# Patient Record
Sex: FEMALE | Race: BLACK OR AFRICAN AMERICAN | Employment: UNEMPLOYED | URBAN - METROPOLITAN AREA
[De-identification: names, ages, dates, MRNs, and addresses within clinical notes are randomized per-mention and may not be internally consistent; named-entity substitution may affect disease eponyms.]

---

## 2019-03-04 ENCOUNTER — HOSPITAL ENCOUNTER (OUTPATIENT)
Dept: MAMMOGRAPHY | Age: 64
Discharge: HOME | End: 2019-03-04
Attending: NURSE PRACTITIONER
Payer: MEDICAID

## 2019-03-04 DIAGNOSIS — Z12.31 VISIT FOR SCREENING MAMMOGRAM: ICD-10-CM

## 2019-03-04 PROCEDURE — 77063 BREAST TOMOSYNTHESIS BI: CPT

## 2019-03-25 ENCOUNTER — HOSPITAL ENCOUNTER (OUTPATIENT)
Dept: MAMMOGRAPHY | Age: 64
Discharge: HOME | End: 2019-03-25
Attending: NURSE PRACTITIONER
Payer: MEDICAID

## 2019-03-25 ENCOUNTER — APPOINTMENT (OUTPATIENT)
Dept: ULTRASOUND IMAGING | Age: 64
End: 2019-03-25
Attending: NURSE PRACTITIONER
Payer: MEDICAID

## 2019-03-25 DIAGNOSIS — R92.2 BREAST DENSITY: ICD-10-CM

## 2019-03-25 PROCEDURE — 77065 DX MAMMO INCL CAD UNI: CPT

## 2020-06-18 ENCOUNTER — HOSPITAL ENCOUNTER (OUTPATIENT)
Dept: MAMMOGRAPHY | Age: 65
Discharge: HOME | End: 2020-06-18
Attending: NURSE PRACTITIONER
Payer: MEDICAID

## 2020-06-18 DIAGNOSIS — Z12.31 VISIT FOR SCREENING MAMMOGRAM: ICD-10-CM

## 2020-06-18 PROCEDURE — 77063 BREAST TOMOSYNTHESIS BI: CPT

## 2020-07-27 ENCOUNTER — HOSPITAL ENCOUNTER (OUTPATIENT)
Dept: MAMMOGRAPHY | Age: 65
Discharge: HOME | End: 2020-07-27
Attending: NURSE PRACTITIONER
Payer: MEDICAID

## 2020-07-27 ENCOUNTER — HOSPITAL ENCOUNTER (OUTPATIENT)
Dept: ULTRASOUND IMAGING | Age: 65
Discharge: HOME | End: 2020-07-27
Attending: NURSE PRACTITIONER
Payer: MEDICAID

## 2020-07-27 DIAGNOSIS — R92.8 ABNORMAL MAMMOGRAM: ICD-10-CM

## 2020-07-27 DIAGNOSIS — N63.0 LUMP OR MASS IN BREAST: ICD-10-CM

## 2020-07-27 PROCEDURE — 76642 ULTRASOUND BREAST LIMITED: CPT

## 2020-07-27 PROCEDURE — 77061 BREAST TOMOSYNTHESIS UNI: CPT

## 2021-01-01 ENCOUNTER — HOSPITAL ENCOUNTER (INPATIENT)
Age: 66
LOS: 4 days | Discharge: HOME | DRG: 435 | End: 2021-10-26
Attending: INTERNAL MEDICINE | Admitting: GENERAL ACUTE CARE HOSPITAL
Payer: MEDICARE

## 2021-01-01 ENCOUNTER — HOSPITAL ENCOUNTER (OUTPATIENT)
Dept: INFUSION THERAPY | Age: 66
Discharge: HOME | End: 2021-12-06
Payer: MEDICARE

## 2021-01-01 ENCOUNTER — HOSPITAL ENCOUNTER (OUTPATIENT)
Dept: MAMMOGRAPHY | Age: 66
Discharge: HOME | End: 2021-06-21
Attending: NURSE PRACTITIONER
Payer: MEDICARE

## 2021-01-01 ENCOUNTER — HOSPITAL ENCOUNTER (OUTPATIENT)
Dept: INFUSION THERAPY | Age: 66
End: 2021-01-01

## 2021-01-01 ENCOUNTER — DOCUMENTATION ONLY (OUTPATIENT)
Dept: ONCOLOGY | Age: 66
End: 2021-01-01

## 2021-01-01 ENCOUNTER — NURSE NAVIGATOR (OUTPATIENT)
Dept: OTHER | Age: 66
End: 2021-01-01

## 2021-01-01 ENCOUNTER — TRANSCRIBE ORDER (OUTPATIENT)
Dept: SCHEDULING | Age: 66
End: 2021-01-01

## 2021-01-01 ENCOUNTER — APPOINTMENT (OUTPATIENT)
Dept: INFUSION THERAPY | Age: 66
End: 2021-01-01

## 2021-01-01 ENCOUNTER — HOSPITAL ENCOUNTER (EMERGENCY)
Age: 66
Discharge: ANOTHER HOSPITAL | End: 2021-10-22
Attending: EMERGENCY MEDICINE
Payer: MEDICARE

## 2021-01-01 ENCOUNTER — APPOINTMENT (OUTPATIENT)
Dept: GENERAL RADIOLOGY | Age: 66
DRG: 435 | End: 2021-01-01
Attending: INTERNAL MEDICINE
Payer: MEDICARE

## 2021-01-01 ENCOUNTER — HOSPITAL ENCOUNTER (OUTPATIENT)
Dept: INFUSION THERAPY | Age: 66
Discharge: HOME | End: 2021-12-09
Payer: MEDICARE

## 2021-01-01 ENCOUNTER — HOSPITAL ENCOUNTER (OUTPATIENT)
Dept: LAB | Age: 66
Discharge: HOME | End: 2021-11-03
Payer: MEDICARE

## 2021-01-01 ENCOUNTER — TELEPHONE (OUTPATIENT)
Dept: ONCOLOGY | Age: 66
End: 2021-01-01

## 2021-01-01 ENCOUNTER — APPOINTMENT (OUTPATIENT)
Dept: MRI IMAGING | Age: 66
DRG: 435 | End: 2021-01-01
Attending: GENERAL ACUTE CARE HOSPITAL
Payer: MEDICARE

## 2021-01-01 ENCOUNTER — OFFICE VISIT (OUTPATIENT)
Dept: ONCOLOGY | Age: 66
End: 2021-01-01
Payer: MEDICARE

## 2021-01-01 ENCOUNTER — ANESTHESIA (OUTPATIENT)
Dept: SURGERY | Age: 66
DRG: 435 | End: 2021-01-01
Payer: MEDICARE

## 2021-01-01 ENCOUNTER — HOSPITAL ENCOUNTER (OUTPATIENT)
Dept: VASCULAR SURGERY | Age: 66
Discharge: HOME | End: 2021-12-08
Attending: INTERNAL MEDICINE
Payer: MEDICARE

## 2021-01-01 ENCOUNTER — HOSPITAL ENCOUNTER (OUTPATIENT)
Dept: INTERVENTIONAL RADIOLOGY/VASCULAR | Age: 66
Discharge: HOME | End: 2021-11-24
Attending: INTERNAL MEDICINE | Admitting: RADIOLOGY
Payer: MEDICARE

## 2021-01-01 ENCOUNTER — HOSPITAL ENCOUNTER (OUTPATIENT)
Dept: VASCULAR SURGERY | Age: 66
Discharge: HOME | End: 2021-11-04
Attending: INTERNAL MEDICINE
Payer: MEDICARE

## 2021-01-01 ENCOUNTER — HOSPITAL ENCOUNTER (OUTPATIENT)
Dept: INTERVENTIONAL RADIOLOGY/VASCULAR | Age: 66
Discharge: HOME | End: 2021-12-01
Payer: MEDICARE

## 2021-01-01 ENCOUNTER — APPOINTMENT (OUTPATIENT)
Dept: GENERAL RADIOLOGY | Age: 66
End: 2021-01-01
Attending: STUDENT IN AN ORGANIZED HEALTH CARE EDUCATION/TRAINING PROGRAM
Payer: MEDICARE

## 2021-01-01 ENCOUNTER — ANESTHESIA EVENT (OUTPATIENT)
Dept: SURGERY | Age: 66
DRG: 435 | End: 2021-01-01
Payer: MEDICARE

## 2021-01-01 ENCOUNTER — HOSPITAL ENCOUNTER (EMERGENCY)
Age: 66
Discharge: HOME | End: 2021-12-13
Attending: EMERGENCY MEDICINE
Payer: MEDICARE

## 2021-01-01 ENCOUNTER — HOSPITAL ENCOUNTER (EMERGENCY)
Age: 66
Discharge: LEFT BEFORE DISCHARGE | End: 2021-12-08
Payer: MEDICARE

## 2021-01-01 ENCOUNTER — HOSPITAL ENCOUNTER (OUTPATIENT)
Dept: INFUSION THERAPY | Age: 66
Discharge: HOME | End: 2021-12-20
Payer: MEDICARE

## 2021-01-01 ENCOUNTER — HOSPITAL ENCOUNTER (OUTPATIENT)
Dept: INTERVENTIONAL RADIOLOGY/VASCULAR | Age: 66
Discharge: HOME | End: 2021-11-24
Attending: INTERNAL MEDICINE
Payer: MEDICARE

## 2021-01-01 ENCOUNTER — HOSPITAL ENCOUNTER (EMERGENCY)
Age: 66
Discharge: HOME | End: 2021-10-12
Attending: STUDENT IN AN ORGANIZED HEALTH CARE EDUCATION/TRAINING PROGRAM
Payer: MEDICARE

## 2021-01-01 ENCOUNTER — OFFICE VISIT (OUTPATIENT)
Dept: FAMILY MEDICINE CLINIC | Age: 66
End: 2021-01-01
Payer: MEDICARE

## 2021-01-01 ENCOUNTER — HOSPITAL ENCOUNTER (OUTPATIENT)
Dept: INFUSION THERAPY | Age: 66
Discharge: HOME | End: 2021-12-07
Payer: MEDICARE

## 2021-01-01 ENCOUNTER — HOSPITAL ENCOUNTER (OUTPATIENT)
Dept: PET IMAGING | Age: 66
Discharge: HOME | End: 2021-11-11
Attending: INTERNAL MEDICINE
Payer: MEDICARE

## 2021-01-01 ENCOUNTER — HOSPITAL ENCOUNTER (OUTPATIENT)
Dept: ULTRASOUND IMAGING | Age: 66
Discharge: HOME | End: 2021-06-21
Attending: NURSE PRACTITIONER
Payer: MEDICARE

## 2021-01-01 ENCOUNTER — APPOINTMENT (OUTPATIENT)
Dept: CT IMAGING | Age: 66
End: 2021-01-01
Attending: EMERGENCY MEDICINE
Payer: MEDICARE

## 2021-01-01 VITALS
DIASTOLIC BLOOD PRESSURE: 72 MMHG | RESPIRATION RATE: 16 BRPM | SYSTOLIC BLOOD PRESSURE: 134 MMHG | HEIGHT: 66 IN | TEMPERATURE: 98.2 F | WEIGHT: 109 LBS | OXYGEN SATURATION: 98 % | HEART RATE: 73 BPM | BODY MASS INDEX: 17.52 KG/M2

## 2021-01-01 VITALS
HEART RATE: 119 BPM | DIASTOLIC BLOOD PRESSURE: 62 MMHG | SYSTOLIC BLOOD PRESSURE: 98 MMHG | TEMPERATURE: 97 F | RESPIRATION RATE: 18 BRPM | OXYGEN SATURATION: 97 %

## 2021-01-01 VITALS
BODY MASS INDEX: 16.74 KG/M2 | DIASTOLIC BLOOD PRESSURE: 62 MMHG | WEIGHT: 104.2 LBS | SYSTOLIC BLOOD PRESSURE: 98 MMHG | HEART RATE: 119 BPM | OXYGEN SATURATION: 100 % | RESPIRATION RATE: 18 BRPM | TEMPERATURE: 97.6 F | HEIGHT: 66 IN

## 2021-01-01 VITALS
HEIGHT: 66 IN | SYSTOLIC BLOOD PRESSURE: 137 MMHG | DIASTOLIC BLOOD PRESSURE: 76 MMHG | OXYGEN SATURATION: 97 % | HEART RATE: 98 BPM | RESPIRATION RATE: 18 BRPM | BODY MASS INDEX: 17.82 KG/M2 | WEIGHT: 110.89 LBS | TEMPERATURE: 99.4 F

## 2021-01-01 VITALS
SYSTOLIC BLOOD PRESSURE: 94 MMHG | OXYGEN SATURATION: 96 % | RESPIRATION RATE: 18 BRPM | TEMPERATURE: 98.2 F | DIASTOLIC BLOOD PRESSURE: 73 MMHG | HEART RATE: 120 BPM

## 2021-01-01 VITALS
OXYGEN SATURATION: 98 % | RESPIRATION RATE: 16 BRPM | DIASTOLIC BLOOD PRESSURE: 83 MMHG | HEART RATE: 122 BPM | TEMPERATURE: 99.8 F | WEIGHT: 107 LBS | BODY MASS INDEX: 17.19 KG/M2 | SYSTOLIC BLOOD PRESSURE: 128 MMHG | HEIGHT: 66 IN

## 2021-01-01 VITALS
RESPIRATION RATE: 16 BRPM | OXYGEN SATURATION: 99 % | TEMPERATURE: 97.3 F | HEIGHT: 66 IN | HEART RATE: 91 BPM | SYSTOLIC BLOOD PRESSURE: 103 MMHG | WEIGHT: 108.8 LBS | BODY MASS INDEX: 17.49 KG/M2 | DIASTOLIC BLOOD PRESSURE: 70 MMHG

## 2021-01-01 VITALS
SYSTOLIC BLOOD PRESSURE: 163 MMHG | BODY MASS INDEX: 23.6 KG/M2 | HEART RATE: 92 BPM | RESPIRATION RATE: 18 BRPM | WEIGHT: 125 LBS | OXYGEN SATURATION: 100 % | HEIGHT: 61 IN | TEMPERATURE: 98.3 F | DIASTOLIC BLOOD PRESSURE: 86 MMHG

## 2021-01-01 VITALS
OXYGEN SATURATION: 97 % | SYSTOLIC BLOOD PRESSURE: 104 MMHG | RESPIRATION RATE: 18 BRPM | WEIGHT: 107 LBS | DIASTOLIC BLOOD PRESSURE: 74 MMHG | BODY MASS INDEX: 17.19 KG/M2 | HEART RATE: 112 BPM | HEIGHT: 66 IN | TEMPERATURE: 99 F

## 2021-01-01 VITALS
TEMPERATURE: 99.8 F | BODY MASS INDEX: 17.29 KG/M2 | HEIGHT: 66 IN | DIASTOLIC BLOOD PRESSURE: 83 MMHG | WEIGHT: 107.6 LBS | OXYGEN SATURATION: 98 % | HEART RATE: 122 BPM | SYSTOLIC BLOOD PRESSURE: 128 MMHG

## 2021-01-01 VITALS
TEMPERATURE: 98 F | HEART RATE: 107 BPM | SYSTOLIC BLOOD PRESSURE: 103 MMHG | HEIGHT: 66 IN | WEIGHT: 107 LBS | DIASTOLIC BLOOD PRESSURE: 62 MMHG | BODY MASS INDEX: 17.19 KG/M2 | OXYGEN SATURATION: 100 % | RESPIRATION RATE: 15 BRPM

## 2021-01-01 VITALS
RESPIRATION RATE: 16 BRPM | DIASTOLIC BLOOD PRESSURE: 72 MMHG | OXYGEN SATURATION: 98 % | DIASTOLIC BLOOD PRESSURE: 75 MMHG | OXYGEN SATURATION: 97 % | SYSTOLIC BLOOD PRESSURE: 114 MMHG | HEIGHT: 66 IN | TEMPERATURE: 98.1 F | WEIGHT: 111 LBS | TEMPERATURE: 98.8 F | BODY MASS INDEX: 17.84 KG/M2 | RESPIRATION RATE: 16 BRPM | HEART RATE: 98 BPM | HEART RATE: 89 BPM | SYSTOLIC BLOOD PRESSURE: 164 MMHG | BODY MASS INDEX: 17.37 KG/M2 | HEIGHT: 66 IN

## 2021-01-01 VITALS
WEIGHT: 111 LBS | HEART RATE: 90 BPM | DIASTOLIC BLOOD PRESSURE: 72 MMHG | BODY MASS INDEX: 17.84 KG/M2 | SYSTOLIC BLOOD PRESSURE: 123 MMHG | OXYGEN SATURATION: 100 % | HEIGHT: 66 IN | RESPIRATION RATE: 18 BRPM

## 2021-01-01 DIAGNOSIS — C7A.098 MALIGNANT CARCINOID TUMORS OF OTHER SITES (HCC): ICD-10-CM

## 2021-01-01 DIAGNOSIS — D64.9 NORMOCYTIC ANEMIA: ICD-10-CM

## 2021-01-01 DIAGNOSIS — C78.7 PANCREATIC CARCINOMA METASTATIC TO LIVER (HCC): Primary | ICD-10-CM

## 2021-01-01 DIAGNOSIS — M79.601 PAIN OF RIGHT UPPER EXTREMITY: ICD-10-CM

## 2021-01-01 DIAGNOSIS — C25.9 PANCREATIC ADENOCARCINOMA (HCC): ICD-10-CM

## 2021-01-01 DIAGNOSIS — C25.9 METASTASIS FROM PANCREATIC CANCER (HCC): ICD-10-CM

## 2021-01-01 DIAGNOSIS — D72.829 LEUKOCYTOSIS, UNSPECIFIED TYPE: ICD-10-CM

## 2021-01-01 DIAGNOSIS — N63.0 BREAST MASS: ICD-10-CM

## 2021-01-01 DIAGNOSIS — K83.1 OBSTRUCTIVE JAUNDICE: Primary | ICD-10-CM

## 2021-01-01 DIAGNOSIS — Z09 FOLLOW-UP EXAM: ICD-10-CM

## 2021-01-01 DIAGNOSIS — C25.9 PANCREATIC CARCINOMA METASTATIC TO LIVER (HCC): Primary | ICD-10-CM

## 2021-01-01 DIAGNOSIS — G89.3 CANCER RELATED PAIN: ICD-10-CM

## 2021-01-01 DIAGNOSIS — R10.84 ABDOMINAL PAIN, GENERALIZED: Primary | ICD-10-CM

## 2021-01-01 DIAGNOSIS — C79.9 METASTASIS FROM PANCREATIC CANCER (HCC): ICD-10-CM

## 2021-01-01 DIAGNOSIS — K86.89 PANCREATIC MASS: Primary | ICD-10-CM

## 2021-01-01 DIAGNOSIS — C78.7 LIVER METASTASES (HCC): ICD-10-CM

## 2021-01-01 DIAGNOSIS — K83.1 OBSTRUCTIVE JAUNDICE: ICD-10-CM

## 2021-01-01 DIAGNOSIS — D50.9 IRON DEFICIENCY ANEMIA, UNSPECIFIED IRON DEFICIENCY ANEMIA TYPE: Primary | ICD-10-CM

## 2021-01-01 DIAGNOSIS — R19.7 DIARRHEA, UNSPECIFIED TYPE: Primary | ICD-10-CM

## 2021-01-01 DIAGNOSIS — M79.89 SWELLING OF ARM: ICD-10-CM

## 2021-01-01 DIAGNOSIS — R92.8 ABNORMAL MAMMOGRAM: ICD-10-CM

## 2021-01-01 DIAGNOSIS — M79.606 PAIN AND SWELLING OF LOWER EXTREMITY, UNSPECIFIED LATERALITY: ICD-10-CM

## 2021-01-01 DIAGNOSIS — R92.2 INCONCLUSIVE MAMMOGRAPHY: Primary | ICD-10-CM

## 2021-01-01 DIAGNOSIS — D50.9 IRON DEFICIENCY ANEMIA, UNSPECIFIED IRON DEFICIENCY ANEMIA TYPE: ICD-10-CM

## 2021-01-01 DIAGNOSIS — C78.7 PANCREATIC CARCINOMA METASTATIC TO LIVER (HCC): ICD-10-CM

## 2021-01-01 DIAGNOSIS — R16.0 LIVER MASS: ICD-10-CM

## 2021-01-01 DIAGNOSIS — Z12.31 VISIT FOR SCREENING MAMMOGRAM: Primary | ICD-10-CM

## 2021-01-01 DIAGNOSIS — M79.89 PAIN AND SWELLING OF LOWER EXTREMITY, UNSPECIFIED LATERALITY: ICD-10-CM

## 2021-01-01 DIAGNOSIS — K86.89 PANCREATIC MASS: ICD-10-CM

## 2021-01-01 DIAGNOSIS — R63.6 PATIENT UNDERWEIGHT: ICD-10-CM

## 2021-01-01 DIAGNOSIS — R10.9 INTERMITTENT ABDOMINAL PAIN: Primary | ICD-10-CM

## 2021-01-01 DIAGNOSIS — C25.9 PANCREATIC CARCINOMA METASTATIC TO LIVER (HCC): ICD-10-CM

## 2021-01-01 DIAGNOSIS — C25.9 PANCREATIC CARCINOMA (HCC): ICD-10-CM

## 2021-01-01 DIAGNOSIS — C7A.098 MALIGNANT CARCINOID TUMORS OF OTHER SITES (HCC): Primary | ICD-10-CM

## 2021-01-01 LAB
ALBUMIN SERPL-MCNC: 1.7 G/DL (ref 3.4–5)
ALBUMIN SERPL-MCNC: 1.8 G/DL (ref 3.4–5)
ALBUMIN SERPL-MCNC: 1.9 G/DL (ref 3.5–5)
ALBUMIN SERPL-MCNC: 1.9 G/DL (ref 3.5–5)
ALBUMIN SERPL-MCNC: 2 G/DL (ref 3.4–5)
ALBUMIN SERPL-MCNC: 2 G/DL (ref 3.5–5)
ALBUMIN SERPL-MCNC: 2 G/DL (ref 3.5–5)
ALBUMIN SERPL-MCNC: 2.1 G/DL (ref 3.4–5)
ALBUMIN SERPL-MCNC: 2.2 G/DL (ref 3.4–5)
ALBUMIN SERPL-MCNC: 2.5 G/DL (ref 3.4–5)
ALBUMIN SERPL-MCNC: 2.5 G/DL (ref 3.4–5)
ALBUMIN SERPL-MCNC: 2.6 G/DL (ref 3.4–5)
ALBUMIN/GLOB SERPL: 0.4 {RATIO} (ref 0.8–1.7)
ALBUMIN/GLOB SERPL: 0.4 {RATIO} (ref 1.1–2.2)
ALBUMIN/GLOB SERPL: 0.5 {RATIO} (ref 0.8–1.7)
ALBUMIN/GLOB SERPL: 0.5 {RATIO} (ref 0.8–1.7)
ALBUMIN/GLOB SERPL: 0.5 {RATIO} (ref 1.1–2.2)
ALBUMIN/GLOB SERPL: 0.6 {RATIO} (ref 0.8–1.7)
ALP SERPL-CCNC: 417 U/L (ref 45–117)
ALP SERPL-CCNC: 432 U/L (ref 45–117)
ALP SERPL-CCNC: 433 U/L (ref 45–117)
ALP SERPL-CCNC: 434 U/L (ref 45–117)
ALP SERPL-CCNC: 445 U/L (ref 45–117)
ALP SERPL-CCNC: 448 U/L (ref 45–117)
ALP SERPL-CCNC: 458 U/L (ref 45–117)
ALP SERPL-CCNC: 492 U/L (ref 45–117)
ALP SERPL-CCNC: 493 U/L (ref 45–117)
ALP SERPL-CCNC: 746 U/L (ref 45–117)
ALP SERPL-CCNC: 782 U/L (ref 45–117)
ALP SERPL-CCNC: 822 U/L (ref 45–117)
ALT SERPL-CCNC: 119 U/L (ref 13–56)
ALT SERPL-CCNC: 248 U/L (ref 12–78)
ALT SERPL-CCNC: 294 U/L (ref 12–78)
ALT SERPL-CCNC: 321 U/L (ref 12–78)
ALT SERPL-CCNC: 352 U/L (ref 12–78)
ALT SERPL-CCNC: 36 U/L (ref 13–56)
ALT SERPL-CCNC: 406 U/L (ref 13–56)
ALT SERPL-CCNC: 505 U/L (ref 13–56)
ALT SERPL-CCNC: 54 U/L (ref 13–56)
ALT SERPL-CCNC: 55 U/L (ref 13–56)
ALT SERPL-CCNC: 579 U/L (ref 13–56)
ALT SERPL-CCNC: 58 U/L (ref 13–56)
ANION GAP SERPL CALC-SCNC: 4 MMOL/L (ref 3–18)
ANION GAP SERPL CALC-SCNC: 4 MMOL/L (ref 5–15)
ANION GAP SERPL CALC-SCNC: 5 MMOL/L (ref 3–18)
ANION GAP SERPL CALC-SCNC: 5 MMOL/L (ref 3–18)
ANION GAP SERPL CALC-SCNC: 6 MMOL/L (ref 3–18)
ANION GAP SERPL CALC-SCNC: 6 MMOL/L (ref 5–15)
ANION GAP SERPL CALC-SCNC: 7 MMOL/L (ref 3–18)
ANION GAP SERPL CALC-SCNC: 7 MMOL/L (ref 3–18)
ANION GAP SERPL CALC-SCNC: 8 MMOL/L (ref 3–18)
ANION GAP SERPL CALC-SCNC: 9 MMOL/L (ref 3–18)
APPEARANCE UR: CLEAR
APTT PPP: 34.6 SEC (ref 23–36.4)
AST SERPL-CCNC: 100 U/L (ref 10–38)
AST SERPL-CCNC: 103 U/L (ref 10–38)
AST SERPL-CCNC: 117 U/L (ref 15–37)
AST SERPL-CCNC: 157 U/L (ref 15–37)
AST SERPL-CCNC: 179 U/L (ref 15–37)
AST SERPL-CCNC: 186 U/L (ref 15–37)
AST SERPL-CCNC: 212 U/L (ref 10–38)
AST SERPL-CCNC: 234 U/L (ref 10–38)
AST SERPL-CCNC: 283 U/L (ref 10–38)
AST SERPL-CCNC: 63 U/L (ref 10–38)
AST SERPL-CCNC: 77 U/L (ref 10–38)
AST SERPL-CCNC: 92 U/L (ref 10–38)
BACTERIA URNS QL MICRO: ABNORMAL /HPF
BASO+EOS+MONOS # BLD AUTO: 1.2 K/UL (ref 0–2.3)
BASO+EOS+MONOS # BLD AUTO: 1.8 K/UL (ref 0–2.3)
BASO+EOS+MONOS NFR BLD AUTO: 5 % (ref 0.1–17)
BASO+EOS+MONOS NFR BLD AUTO: 7 % (ref 0.1–17)
BASOPHILS # BLD: 0 K/UL (ref 0–0.1)
BASOPHILS # BLD: 0.1 K/UL (ref 0–0.1)
BASOPHILS # BLD: 0.1 K/UL (ref 0–0.1)
BASOPHILS NFR BLD: 0 % (ref 0–2)
BASOPHILS NFR BLD: 1 % (ref 0–1)
BILIRUB DIRECT SERPL-MCNC: 1.6 MG/DL (ref 0–0.2)
BILIRUB DIRECT SERPL-MCNC: 5.6 MG/DL (ref 0–0.2)
BILIRUB DIRECT SERPL-MCNC: 6.2 MG/DL (ref 0–0.2)
BILIRUB SERPL-MCNC: 2 MG/DL (ref 0.2–1)
BILIRUB SERPL-MCNC: 2.3 MG/DL (ref 0.2–1)
BILIRUB SERPL-MCNC: 3.6 MG/DL (ref 0.2–1)
BILIRUB SERPL-MCNC: 4.5 MG/DL (ref 0.2–1)
BILIRUB SERPL-MCNC: 5.1 MG/DL (ref 0.2–1)
BILIRUB SERPL-MCNC: 6.2 MG/DL (ref 0.2–1)
BILIRUB SERPL-MCNC: 7.3 MG/DL (ref 0.2–1)
BILIRUB SERPL-MCNC: 7.5 MG/DL (ref 0.2–1)
BILIRUB SERPL-MCNC: 8 MG/DL (ref 0.2–1)
BILIRUB SERPL-MCNC: 8.6 MG/DL (ref 0.2–1)
BILIRUB SERPL-MCNC: 8.8 MG/DL (ref 0.2–1)
BILIRUB SERPL-MCNC: 9.4 MG/DL (ref 0.2–1)
BILIRUB UR QL: ABNORMAL
BUN SERPL-MCNC: 13 MG/DL (ref 7–18)
BUN SERPL-MCNC: 2 MG/DL (ref 6–20)
BUN SERPL-MCNC: 23 MG/DL (ref 7–18)
BUN SERPL-MCNC: 4 MG/DL (ref 7–18)
BUN SERPL-MCNC: 5 MG/DL (ref 6–20)
BUN SERPL-MCNC: 6 MG/DL (ref 6–20)
BUN SERPL-MCNC: 7 MG/DL (ref 6–20)
BUN SERPL-MCNC: 7 MG/DL (ref 7–18)
BUN SERPL-MCNC: 8 MG/DL (ref 7–18)
BUN SERPL-MCNC: 9 MG/DL (ref 7–18)
BUN/CREAT SERPL: 11 (ref 12–20)
BUN/CREAT SERPL: 11 (ref 12–20)
BUN/CREAT SERPL: 14 (ref 12–20)
BUN/CREAT SERPL: 15 (ref 12–20)
BUN/CREAT SERPL: 16 (ref 12–20)
BUN/CREAT SERPL: 16 (ref 12–20)
BUN/CREAT SERPL: 17 (ref 12–20)
BUN/CREAT SERPL: 19 (ref 12–20)
BUN/CREAT SERPL: 19 (ref 12–20)
BUN/CREAT SERPL: 23 (ref 12–20)
BUN/CREAT SERPL: 42 (ref 12–20)
BUN/CREAT SERPL: 6 (ref 12–20)
CALCIUM SERPL-MCNC: 7.7 MG/DL (ref 8.5–10.1)
CALCIUM SERPL-MCNC: 7.9 MG/DL (ref 8.5–10.1)
CALCIUM SERPL-MCNC: 7.9 MG/DL (ref 8.5–10.1)
CALCIUM SERPL-MCNC: 8.4 MG/DL (ref 8.5–10.1)
CALCIUM SERPL-MCNC: 8.4 MG/DL (ref 8.5–10.1)
CALCIUM SERPL-MCNC: 8.5 MG/DL (ref 8.5–10.1)
CALCIUM SERPL-MCNC: 8.7 MG/DL (ref 8.5–10.1)
CALCIUM SERPL-MCNC: 9 MG/DL (ref 8.5–10.1)
CALCIUM SERPL-MCNC: 9.1 MG/DL (ref 8.5–10.1)
CANCER AG19-9 SERPL-ACNC: 12 U/ML (ref 0–35)
CANCER AG19-9 SERPL-ACNC: 19 U/ML (ref 0–35)
CHLORIDE SERPL-SCNC: 100 MMOL/L (ref 97–108)
CHLORIDE SERPL-SCNC: 101 MMOL/L (ref 100–111)
CHLORIDE SERPL-SCNC: 102 MMOL/L (ref 100–111)
CHLORIDE SERPL-SCNC: 102 MMOL/L (ref 97–108)
CHLORIDE SERPL-SCNC: 103 MMOL/L (ref 97–108)
CHLORIDE SERPL-SCNC: 104 MMOL/L (ref 97–108)
CHLORIDE SERPL-SCNC: 107 MMOL/L (ref 100–111)
CHLORIDE SERPL-SCNC: 92 MMOL/L (ref 100–111)
CHLORIDE SERPL-SCNC: 95 MMOL/L (ref 100–111)
CHLORIDE SERPL-SCNC: 97 MMOL/L (ref 100–111)
CO2 SERPL-SCNC: 27 MMOL/L (ref 21–32)
CO2 SERPL-SCNC: 28 MMOL/L (ref 21–32)
CO2 SERPL-SCNC: 29 MMOL/L (ref 21–32)
CO2 SERPL-SCNC: 30 MMOL/L (ref 21–32)
CO2 SERPL-SCNC: 31 MMOL/L (ref 21–32)
CO2 SERPL-SCNC: 31 MMOL/L (ref 21–32)
COLOR UR: ABNORMAL
COVID-19 RAPID TEST, COVR: NOT DETECTED
CREAT SERPL-MCNC: 0.33 MG/DL (ref 0.55–1.02)
CREAT SERPL-MCNC: 0.38 MG/DL (ref 0.55–1.02)
CREAT SERPL-MCNC: 0.38 MG/DL (ref 0.6–1.3)
CREAT SERPL-MCNC: 0.43 MG/DL (ref 0.6–1.3)
CREAT SERPL-MCNC: 0.46 MG/DL (ref 0.55–1.02)
CREAT SERPL-MCNC: 0.47 MG/DL (ref 0.6–1.3)
CREAT SERPL-MCNC: 0.48 MG/DL (ref 0.6–1.3)
CREAT SERPL-MCNC: 0.5 MG/DL (ref 0.55–1.02)
CREAT SERPL-MCNC: 0.52 MG/DL (ref 0.6–1.3)
CREAT SERPL-MCNC: 0.55 MG/DL (ref 0.6–1.3)
CREAT SERPL-MCNC: 0.57 MG/DL (ref 0.6–1.3)
CREAT SERPL-MCNC: 0.58 MG/DL (ref 0.6–1.3)
CREAT UR-MCNC: 0.6 MG/DL (ref 0.6–1.3)
DIFFERENTIAL METHOD BLD: ABNORMAL
EOSINOPHIL # BLD: 0 K/UL (ref 0–0.4)
EOSINOPHIL # BLD: 0 K/UL (ref 0–0.4)
EOSINOPHIL # BLD: 0.1 K/UL (ref 0–0.4)
EOSINOPHIL # BLD: 0.3 K/UL (ref 0–0.4)
EOSINOPHIL # BLD: 1 K/UL (ref 0–0.4)
EOSINOPHIL NFR BLD: 0 % (ref 0–5)
EOSINOPHIL NFR BLD: 0 % (ref 0–5)
EOSINOPHIL NFR BLD: 1 % (ref 0–5)
EOSINOPHIL NFR BLD: 2 % (ref 0–5)
EOSINOPHIL NFR BLD: 3 % (ref 0–5)
EOSINOPHIL NFR BLD: 3 % (ref 0–5)
EOSINOPHIL NFR BLD: 3 % (ref 0–7)
EOSINOPHIL NFR BLD: 4 % (ref 0–5)
EPITH CASTS URNS QL MICRO: ABNORMAL /LPF (ref 0–5)
ERYTHROCYTE [DISTWIDTH] IN BLOOD BY AUTOMATED COUNT: 14.6 % (ref 11.6–14.5)
ERYTHROCYTE [DISTWIDTH] IN BLOOD BY AUTOMATED COUNT: 14.6 % (ref 11.6–14.5)
ERYTHROCYTE [DISTWIDTH] IN BLOOD BY AUTOMATED COUNT: 14.7 % (ref 11.6–14.5)
ERYTHROCYTE [DISTWIDTH] IN BLOOD BY AUTOMATED COUNT: 14.9 % (ref 11.6–14.5)
ERYTHROCYTE [DISTWIDTH] IN BLOOD BY AUTOMATED COUNT: 15 % (ref 11.5–14.5)
ERYTHROCYTE [DISTWIDTH] IN BLOOD BY AUTOMATED COUNT: 15.2 % (ref 11.6–14.5)
ERYTHROCYTE [DISTWIDTH] IN BLOOD BY AUTOMATED COUNT: 16.9 % (ref 11.6–14.5)
ERYTHROCYTE [DISTWIDTH] IN BLOOD BY AUTOMATED COUNT: 17.3 % (ref 11.6–14.5)
ERYTHROCYTE [DISTWIDTH] IN BLOOD BY AUTOMATED COUNT: 17.5 % (ref 11.5–14.5)
ERYTHROCYTE [DISTWIDTH] IN BLOOD BY AUTOMATED COUNT: 18.2 % (ref 11.5–14.5)
ERYTHROCYTE [DISTWIDTH] IN BLOOD BY AUTOMATED COUNT: 23.2 % (ref 11.6–14.5)
FERRITIN SERPL-MCNC: 748 NG/ML (ref 8–388)
FOLATE SERPL-MCNC: 13.5 NG/ML (ref 3.1–17.5)
GLOBULIN SER CALC-MCNC: 3.8 G/DL (ref 2–4)
GLOBULIN SER CALC-MCNC: 3.9 G/DL (ref 2–4)
GLOBULIN SER CALC-MCNC: 4 G/DL (ref 2–4)
GLOBULIN SER CALC-MCNC: 4.1 G/DL (ref 2–4)
GLOBULIN SER CALC-MCNC: 4.1 G/DL (ref 2–4)
GLOBULIN SER CALC-MCNC: 4.2 G/DL (ref 2–4)
GLOBULIN SER CALC-MCNC: 4.3 G/DL (ref 2–4)
GLOBULIN SER CALC-MCNC: 4.4 G/DL (ref 2–4)
GLOBULIN SER CALC-MCNC: 4.7 G/DL (ref 2–4)
GLOBULIN SER CALC-MCNC: 5.1 G/DL (ref 2–4)
GLUCOSE SERPL-MCNC: 106 MG/DL (ref 74–99)
GLUCOSE SERPL-MCNC: 106 MG/DL (ref 74–99)
GLUCOSE SERPL-MCNC: 111 MG/DL (ref 65–100)
GLUCOSE SERPL-MCNC: 112 MG/DL (ref 65–100)
GLUCOSE SERPL-MCNC: 114 MG/DL (ref 74–99)
GLUCOSE SERPL-MCNC: 115 MG/DL (ref 65–100)
GLUCOSE SERPL-MCNC: 124 MG/DL (ref 74–99)
GLUCOSE SERPL-MCNC: 145 MG/DL (ref 74–99)
GLUCOSE SERPL-MCNC: 86 MG/DL (ref 74–99)
GLUCOSE SERPL-MCNC: 95 MG/DL (ref 74–99)
GLUCOSE SERPL-MCNC: 95 MG/DL (ref 74–99)
GLUCOSE SERPL-MCNC: 98 MG/DL (ref 65–100)
GLUCOSE UR STRIP.AUTO-MCNC: 250 MG/DL
HBV CORE AB SERPL QL IA: NEGATIVE
HBV CORE IGM SER QL: NEGATIVE
HBV SURFACE AG SER QL: <0.1 INDEX
HBV SURFACE AG SER QL: NEGATIVE
HCT VFR BLD AUTO: 31.6 % (ref 35–47)
HCT VFR BLD AUTO: 31.7 % (ref 35–45)
HCT VFR BLD AUTO: 32.1 % (ref 35–45)
HCT VFR BLD AUTO: 33.3 % (ref 36–48)
HCT VFR BLD AUTO: 33.4 % (ref 35–45)
HCT VFR BLD AUTO: 33.7 % (ref 35–45)
HCT VFR BLD AUTO: 34.8 % (ref 35–45)
HCT VFR BLD AUTO: 35 % (ref 35–45)
HCT VFR BLD AUTO: 37 % (ref 36–48)
HCT VFR BLD AUTO: 37.3 % (ref 35–45)
HCT VFR BLD AUTO: 38.2 % (ref 35–45)
HGB BLD-MCNC: 10.3 G/DL (ref 12–16)
HGB BLD-MCNC: 10.6 G/DL (ref 12–16)
HGB BLD-MCNC: 10.6 G/DL (ref 12–16)
HGB BLD-MCNC: 10.7 G/DL (ref 11.5–16)
HGB BLD-MCNC: 10.9 G/DL (ref 12–16)
HGB BLD-MCNC: 11.3 G/DL (ref 12–16)
HGB BLD-MCNC: 11.4 G/DL (ref 12–16)
HGB BLD-MCNC: 11.6 G/DL (ref 12–16)
HGB BLD-MCNC: 11.6 G/DL (ref 12–16)
HGB BLD-MCNC: 11.8 G/DL (ref 12–16)
HGB BLD-MCNC: 12.6 G/DL (ref 12–16)
HGB UR QL STRIP: NEGATIVE
IMM GRANULOCYTES # BLD AUTO: 0 K/UL
IMM GRANULOCYTES # BLD AUTO: 0 K/UL (ref 0–0.04)
IMM GRANULOCYTES NFR BLD AUTO: 0 %
IMM GRANULOCYTES NFR BLD AUTO: 0 % (ref 0–0.5)
INR PPP: 1.2 (ref 0.9–1.1)
INR PPP: 1.6 (ref 0.8–1.2)
IRON SATN MFR SERPL: 11 % (ref 20–50)
IRON SERPL-MCNC: 21 UG/DL (ref 50–175)
KETONES UR QL STRIP.AUTO: ABNORMAL MG/DL
LACTATE BLD-SCNC: 2.16 MMOL/L (ref 0.4–2)
LEUKOCYTE ESTERASE UR QL STRIP.AUTO: ABNORMAL
LIPASE SERPL-CCNC: 135 U/L (ref 73–393)
LIPASE SERPL-CCNC: 1861 U/L (ref 73–393)
LIPASE SERPL-CCNC: 2695 U/L (ref 73–393)
LIPASE SERPL-CCNC: 3307 U/L (ref 73–393)
LIPASE SERPL-CCNC: 5060 U/L (ref 73–393)
LYMPHOCYTES # BLD: 0.5 K/UL (ref 0.9–3.6)
LYMPHOCYTES # BLD: 1.4 K/UL (ref 0.9–3.6)
LYMPHOCYTES # BLD: 1.6 K/UL (ref 0.8–3.5)
LYMPHOCYTES # BLD: 1.7 K/UL (ref 0.9–3.6)
LYMPHOCYTES # BLD: 1.7 K/UL (ref 0.9–3.6)
LYMPHOCYTES # BLD: 1.8 K/UL (ref 0.9–3.6)
LYMPHOCYTES # BLD: 2.1 K/UL (ref 1.1–5.9)
LYMPHOCYTES # BLD: 2.3 K/UL (ref 1.1–5.9)
LYMPHOCYTES # BLD: 3.5 K/UL (ref 0.9–3.6)
LYMPHOCYTES # BLD: 4.4 K/UL (ref 0.9–3.6)
LYMPHOCYTES NFR BLD: 10 % (ref 21–52)
LYMPHOCYTES NFR BLD: 12 % (ref 21–52)
LYMPHOCYTES NFR BLD: 13 % (ref 21–52)
LYMPHOCYTES NFR BLD: 15 % (ref 21–52)
LYMPHOCYTES NFR BLD: 15 % (ref 21–52)
LYMPHOCYTES NFR BLD: 16 % (ref 12–49)
LYMPHOCYTES NFR BLD: 16 % (ref 21–52)
LYMPHOCYTES NFR BLD: 2 % (ref 21–52)
LYMPHOCYTES NFR BLD: 8 % (ref 14–44)
LYMPHOCYTES NFR BLD: 9 % (ref 14–44)
MAGNESIUM SERPL-MCNC: 1.8 MG/DL (ref 1.6–2.4)
MAGNESIUM SERPL-MCNC: 2.2 MG/DL (ref 1.6–2.6)
MCH RBC QN AUTO: 27.2 PG (ref 24–34)
MCH RBC QN AUTO: 27.2 PG (ref 24–34)
MCH RBC QN AUTO: 27.4 PG (ref 24–34)
MCH RBC QN AUTO: 27.4 PG (ref 24–34)
MCH RBC QN AUTO: 27.7 PG (ref 24–34)
MCH RBC QN AUTO: 28 PG (ref 25–35)
MCH RBC QN AUTO: 28.2 PG (ref 25–35)
MCH RBC QN AUTO: 28.5 PG (ref 24–34)
MCH RBC QN AUTO: 28.6 PG (ref 24–34)
MCH RBC QN AUTO: 28.7 PG (ref 24–34)
MCH RBC QN AUTO: 28.8 PG (ref 26–34)
MCHC RBC AUTO-ENTMCNC: 30.9 G/DL (ref 31–37)
MCHC RBC AUTO-ENTMCNC: 31.3 G/DL (ref 31–37)
MCHC RBC AUTO-ENTMCNC: 31.4 G/DL (ref 31–37)
MCHC RBC AUTO-ENTMCNC: 31.8 G/DL (ref 31–37)
MCHC RBC AUTO-ENTMCNC: 32.3 G/DL (ref 31–37)
MCHC RBC AUTO-ENTMCNC: 32.5 G/DL (ref 31–37)
MCHC RBC AUTO-ENTMCNC: 33 G/DL (ref 31–37)
MCHC RBC AUTO-ENTMCNC: 33.8 G/DL (ref 31–37)
MCHC RBC AUTO-ENTMCNC: 33.9 G/DL (ref 30–36.5)
MCHC RBC AUTO-ENTMCNC: 34.1 G/DL (ref 31–37)
MCHC RBC AUTO-ENTMCNC: 34.4 G/DL (ref 31–37)
MCV RBC AUTO: 82.9 FL (ref 78–100)
MCV RBC AUTO: 83 FL (ref 78–100)
MCV RBC AUTO: 83.5 FL (ref 78–100)
MCV RBC AUTO: 84.1 FL (ref 78–100)
MCV RBC AUTO: 84.3 FL (ref 78–100)
MCV RBC AUTO: 85 FL (ref 78–100)
MCV RBC AUTO: 85.2 FL (ref 80–99)
MCV RBC AUTO: 86.8 FL (ref 78–100)
MCV RBC AUTO: 88.6 FL (ref 78–102)
MCV RBC AUTO: 89.2 FL (ref 78–102)
MCV RBC AUTO: 89.7 FL (ref 78–100)
METAMYELOCYTES NFR BLD MANUAL: 3 %
MONOCYTES # BLD: 0.6 K/UL (ref 0.05–1.2)
MONOCYTES # BLD: 0.8 K/UL (ref 0.05–1.2)
MONOCYTES # BLD: 1 K/UL (ref 0.05–1.2)
MONOCYTES # BLD: 1 K/UL (ref 0–1)
MONOCYTES # BLD: 1.1 K/UL (ref 0.05–1.2)
MONOCYTES # BLD: 1.1 K/UL (ref 0.05–1.2)
MONOCYTES NFR BLD: 2 % (ref 3–10)
MONOCYTES NFR BLD: 3 % (ref 3–10)
MONOCYTES NFR BLD: 4 % (ref 3–10)
MONOCYTES NFR BLD: 7 % (ref 3–10)
MONOCYTES NFR BLD: 8 % (ref 3–10)
MONOCYTES NFR BLD: 9 % (ref 3–10)
MONOCYTES NFR BLD: 9 % (ref 3–10)
MONOCYTES NFR BLD: 9 % (ref 5–13)
MUCOUS THREADS URNS QL MICRO: ABNORMAL /LPF
MYELOCYTES NFR BLD MANUAL: 2 %
NEUTS BAND NFR BLD MANUAL: 1 % (ref 0–5)
NEUTS BAND NFR BLD MANUAL: 10 % (ref 0–5)
NEUTS SEG # BLD: 10.1 K/UL (ref 1.8–8)
NEUTS SEG # BLD: 21.6 K/UL (ref 1.8–9.5)
NEUTS SEG # BLD: 22.4 K/UL (ref 1.8–9.5)
NEUTS SEG # BLD: 23.6 K/UL (ref 1.8–8)
NEUTS SEG # BLD: 24 K/UL (ref 1.8–8)
NEUTS SEG # BLD: 29 K/UL (ref 1.8–8)
NEUTS SEG # BLD: 7.6 K/UL (ref 1.8–8)
NEUTS SEG # BLD: 7.6 K/UL (ref 1.8–8)
NEUTS SEG # BLD: 8 K/UL (ref 1.8–8)
NEUTS SEG # BLD: 9.3 K/UL (ref 1.8–8)
NEUTS SEG NFR BLD: 71 % (ref 32–75)
NEUTS SEG NFR BLD: 71 % (ref 40–73)
NEUTS SEG NFR BLD: 72 % (ref 40–73)
NEUTS SEG NFR BLD: 72 % (ref 40–73)
NEUTS SEG NFR BLD: 75 % (ref 40–73)
NEUTS SEG NFR BLD: 80 % (ref 40–73)
NEUTS SEG NFR BLD: 82 % (ref 40–73)
NEUTS SEG NFR BLD: 84 % (ref 40–70)
NEUTS SEG NFR BLD: 87 % (ref 40–70)
NEUTS SEG NFR BLD: 90 % (ref 40–73)
NITRITE UR QL STRIP.AUTO: POSITIVE
NRBC # BLD: 0 K/UL (ref 0–0.01)
NRBC # BLD: 0.02 K/UL (ref 0–0.01)
NRBC # BLD: 0.16 K/UL (ref 0–0.01)
NRBC BLD-RTO: 0 PER 100 WBC
NRBC BLD-RTO: 0.1 PER 100 WBC
NRBC BLD-RTO: 0.5 PER 100 WBC
PH UR STRIP: 5.5 [PH] (ref 5–8)
PLATELET # BLD AUTO: 165 K/UL (ref 135–420)
PLATELET # BLD AUTO: 319 K/UL (ref 150–400)
PLATELET # BLD AUTO: 333 K/UL (ref 135–420)
PLATELET # BLD AUTO: 347 K/UL (ref 135–420)
PLATELET # BLD AUTO: 355 K/UL (ref 135–420)
PLATELET # BLD AUTO: 359 K/UL (ref 135–420)
PLATELET # BLD AUTO: 368 K/UL (ref 135–420)
PLATELET # BLD AUTO: 629 K/UL (ref 135–420)
PLATELET # BLD AUTO: 72 K/UL (ref 135–420)
PLATELET # BLD AUTO: NORMAL K/UL (ref 135–420)
PLATELET COMMENTS,PCOM: ABNORMAL
PMV BLD AUTO: 10.1 FL (ref 9.2–11.8)
PMV BLD AUTO: 10.9 FL (ref 9.2–11.8)
PMV BLD AUTO: 11.4 FL (ref 9.2–11.8)
PMV BLD AUTO: 12.2 FL (ref 9.2–11.8)
PMV BLD AUTO: 9.1 FL (ref 9.2–11.8)
PMV BLD AUTO: 9.2 FL (ref 9.2–11.8)
PMV BLD AUTO: 9.6 FL (ref 9.2–11.8)
PMV BLD AUTO: 9.6 FL (ref 9.2–11.8)
PMV BLD AUTO: 9.7 FL (ref 8.9–12.9)
POTASSIUM SERPL-SCNC: 3.4 MMOL/L (ref 3.5–5.5)
POTASSIUM SERPL-SCNC: 3.5 MMOL/L (ref 3.5–5.1)
POTASSIUM SERPL-SCNC: 3.5 MMOL/L (ref 3.5–5.5)
POTASSIUM SERPL-SCNC: 3.7 MMOL/L (ref 3.5–5.1)
POTASSIUM SERPL-SCNC: 3.7 MMOL/L (ref 3.5–5.5)
POTASSIUM SERPL-SCNC: 3.8 MMOL/L (ref 3.5–5.1)
POTASSIUM SERPL-SCNC: 3.9 MMOL/L (ref 3.5–5.1)
POTASSIUM SERPL-SCNC: 3.9 MMOL/L (ref 3.5–5.5)
POTASSIUM SERPL-SCNC: 4 MMOL/L (ref 3.5–5.5)
POTASSIUM SERPL-SCNC: 4.1 MMOL/L (ref 3.5–5.5)
POTASSIUM SERPL-SCNC: 4.1 MMOL/L (ref 3.5–5.5)
POTASSIUM SERPL-SCNC: 4.2 MMOL/L (ref 3.5–5.5)
PROT SERPL-MCNC: 5.5 G/DL (ref 6.4–8.2)
PROT SERPL-MCNC: 6 G/DL (ref 6.4–8.2)
PROT SERPL-MCNC: 6 G/DL (ref 6.4–8.2)
PROT SERPL-MCNC: 6.1 G/DL (ref 6.4–8.2)
PROT SERPL-MCNC: 6.3 G/DL (ref 6.4–8.2)
PROT SERPL-MCNC: 6.4 G/DL (ref 6.4–8.2)
PROT SERPL-MCNC: 6.5 G/DL (ref 6.4–8.2)
PROT SERPL-MCNC: 6.8 G/DL (ref 6.4–8.2)
PROT SERPL-MCNC: 6.9 G/DL (ref 6.4–8.2)
PROT SERPL-MCNC: 7.1 G/DL (ref 6.4–8.2)
PROT UR STRIP-MCNC: 30 MG/DL
PROTHROMBIN TIME: 12.5 SEC (ref 9–11.1)
PROTHROMBIN TIME: 19.1 SEC (ref 11.5–15.2)
RBC # BLD AUTO: 3.71 M/UL (ref 3.8–5.2)
RBC # BLD AUTO: 3.76 M/UL (ref 4.1–5.1)
RBC # BLD AUTO: 3.76 M/UL (ref 4.2–5.3)
RBC # BLD AUTO: 3.87 M/UL (ref 4.2–5.3)
RBC # BLD AUTO: 4 M/UL (ref 4.2–5.3)
RBC # BLD AUTO: 4.01 M/UL (ref 4.2–5.3)
RBC # BLD AUTO: 4.06 M/UL (ref 4.2–5.3)
RBC # BLD AUTO: 4.15 M/UL (ref 4.1–5.1)
RBC # BLD AUTO: 4.16 M/UL (ref 4.2–5.3)
RBC # BLD AUTO: 4.26 M/UL (ref 4.2–5.3)
RBC # BLD AUTO: 4.39 M/UL (ref 4.2–5.3)
RBC #/AREA URNS HPF: NEGATIVE /HPF (ref 0–5)
RBC MORPH BLD: ABNORMAL
SODIUM SERPL-SCNC: 131 MMOL/L (ref 136–145)
SODIUM SERPL-SCNC: 133 MMOL/L (ref 136–145)
SODIUM SERPL-SCNC: 133 MMOL/L (ref 136–145)
SODIUM SERPL-SCNC: 134 MMOL/L (ref 136–145)
SODIUM SERPL-SCNC: 135 MMOL/L (ref 136–145)
SODIUM SERPL-SCNC: 136 MMOL/L (ref 136–145)
SODIUM SERPL-SCNC: 136 MMOL/L (ref 136–145)
SODIUM SERPL-SCNC: 138 MMOL/L (ref 136–145)
SODIUM SERPL-SCNC: 139 MMOL/L (ref 136–145)
SODIUM SERPL-SCNC: 139 MMOL/L (ref 136–145)
SOURCE, COVRS: NORMAL
SP GR UR REFRACTOMETRY: >1.03 (ref 1–1.03)
TIBC SERPL-MCNC: 197 UG/DL (ref 250–450)
UROBILINOGEN UR QL STRIP.AUTO: 1 EU/DL (ref 0.2–1)
VIT B12 SERPL-MCNC: >2000 PG/ML (ref 211–911)
WBC # BLD AUTO: 10.6 K/UL (ref 3.6–11)
WBC # BLD AUTO: 10.7 K/UL (ref 4.6–13.2)
WBC # BLD AUTO: 11.2 K/UL (ref 4.6–13.2)
WBC # BLD AUTO: 11.6 K/UL (ref 4.6–13.2)
WBC # BLD AUTO: 13.4 K/UL (ref 4.6–13.2)
WBC # BLD AUTO: 19 K/UL (ref 4.6–13.2)
WBC # BLD AUTO: 25.7 K/UL (ref 4.5–13)
WBC # BLD AUTO: 25.7 K/UL (ref 4.5–13)
WBC # BLD AUTO: 26.1 K/UL (ref 4.6–13.2)
WBC # BLD AUTO: 29 K/UL (ref 4.6–13.2)
WBC # BLD AUTO: 35.4 K/UL (ref 4.6–13.2)
WBC MORPH BLD: ABNORMAL
WBC URNS QL MICRO: ABNORMAL /HPF (ref 0–4)

## 2021-01-01 PROCEDURE — 85025 COMPLETE CBC W/AUTO DIFF WBC: CPT

## 2021-01-01 PROCEDURE — 99213 OFFICE O/P EST LOW 20 MIN: CPT | Performed by: NURSE PRACTITIONER

## 2021-01-01 PROCEDURE — G0463 HOSPITAL OUTPT CLINIC VISIT: HCPCS | Performed by: INTERNAL MEDICINE

## 2021-01-01 PROCEDURE — 74011250636 HC RX REV CODE- 250/636: Performed by: RADIOLOGY

## 2021-01-01 PROCEDURE — 74011000250 HC RX REV CODE- 250: Performed by: INTERNAL MEDICINE

## 2021-01-01 PROCEDURE — 80076 HEPATIC FUNCTION PANEL: CPT

## 2021-01-01 PROCEDURE — 80048 BASIC METABOLIC PNL TOTAL CA: CPT

## 2021-01-01 PROCEDURE — 99285 EMERGENCY DEPT VISIT HI MDM: CPT

## 2021-01-01 PROCEDURE — 82565 ASSAY OF CREATININE: CPT

## 2021-01-01 PROCEDURE — 86704 HEP B CORE ANTIBODY TOTAL: CPT

## 2021-01-01 PROCEDURE — 74183 MRI ABD W/O CNTR FLWD CNTR: CPT

## 2021-01-01 PROCEDURE — 74011250636 HC RX REV CODE- 250/636: Performed by: EMERGENCY MEDICINE

## 2021-01-01 PROCEDURE — 87635 SARS-COV-2 COVID-19 AMP PRB: CPT

## 2021-01-01 PROCEDURE — 99214 OFFICE O/P EST MOD 30 MIN: CPT | Performed by: INTERNAL MEDICINE

## 2021-01-01 PROCEDURE — 80053 COMPREHEN METABOLIC PANEL: CPT

## 2021-01-01 PROCEDURE — 77030008684 HC TU ET CUF COVD -B: Performed by: NURSE ANESTHETIST, CERTIFIED REGISTERED

## 2021-01-01 PROCEDURE — 93970 EXTREMITY STUDY: CPT

## 2021-01-01 PROCEDURE — 96375 TX/PRO/DX INJ NEW DRUG ADDON: CPT

## 2021-01-01 PROCEDURE — 83690 ASSAY OF LIPASE: CPT

## 2021-01-01 PROCEDURE — 74011000258 HC RX REV CODE- 258: Performed by: NURSE PRACTITIONER

## 2021-01-01 PROCEDURE — 96415 CHEMO IV INFUSION ADDL HR: CPT

## 2021-01-01 PROCEDURE — 76942 ECHO GUIDE FOR BIOPSY: CPT

## 2021-01-01 PROCEDURE — 74011000636 HC RX REV CODE- 636: Performed by: EMERGENCY MEDICINE

## 2021-01-01 PROCEDURE — 74011250636 HC RX REV CODE- 250/636: Performed by: INTERNAL MEDICINE

## 2021-01-01 PROCEDURE — G8427 DOCREV CUR MEDS BY ELIG CLIN: HCPCS | Performed by: STUDENT IN AN ORGANIZED HEALTH CARE EDUCATION/TRAINING PROGRAM

## 2021-01-01 PROCEDURE — 74330 X-RAY BILE/PANC ENDOSCOPY: CPT

## 2021-01-01 PROCEDURE — G9899 SCRN MAM PERF RSLTS DOC: HCPCS | Performed by: INTERNAL MEDICINE

## 2021-01-01 PROCEDURE — 83735 ASSAY OF MAGNESIUM: CPT

## 2021-01-01 PROCEDURE — 1101F PT FALLS ASSESS-DOCD LE1/YR: CPT | Performed by: NURSE PRACTITIONER

## 2021-01-01 PROCEDURE — 3017F COLORECTAL CA SCREEN DOC REV: CPT | Performed by: NURSE PRACTITIONER

## 2021-01-01 PROCEDURE — 77030040361 HC SLV COMPR DVT MDII -B

## 2021-01-01 PROCEDURE — 77030020268 HC MISC GENERAL SUPPLY: Performed by: INTERNAL MEDICINE

## 2021-01-01 PROCEDURE — 36415 COLL VENOUS BLD VENIPUNCTURE: CPT

## 2021-01-01 PROCEDURE — 96360 HYDRATION IV INFUSION INIT: CPT

## 2021-01-01 PROCEDURE — G8419 CALC BMI OUT NRM PARAM NOF/U: HCPCS | Performed by: INTERNAL MEDICINE

## 2021-01-01 PROCEDURE — 76010000149 HC OR TIME 1 TO 1.5 HR: Performed by: INTERNAL MEDICINE

## 2021-01-01 PROCEDURE — 74019 RADEX ABDOMEN 2 VIEWS: CPT

## 2021-01-01 PROCEDURE — 96413 CHEMO IV INFUSION 1 HR: CPT

## 2021-01-01 PROCEDURE — 75810000275 HC EMERGENCY DEPT VISIT NO LEVEL OF CARE

## 2021-01-01 PROCEDURE — 74011000250 HC RX REV CODE- 250: Performed by: NURSE ANESTHETIST, CERTIFIED REGISTERED

## 2021-01-01 PROCEDURE — 88341 IMHCHEM/IMCYTCHM EA ADD ANTB: CPT

## 2021-01-01 PROCEDURE — 1101F PT FALLS ASSESS-DOCD LE1/YR: CPT | Performed by: INTERNAL MEDICINE

## 2021-01-01 PROCEDURE — 96374 THER/PROPH/DIAG INJ IV PUSH: CPT

## 2021-01-01 PROCEDURE — 99212 OFFICE O/P EST SF 10 MIN: CPT | Performed by: STUDENT IN AN ORGANIZED HEALTH CARE EDUCATION/TRAINING PROGRAM

## 2021-01-01 PROCEDURE — 74011250637 HC RX REV CODE- 250/637: Performed by: GENERAL ACUTE CARE HOSPITAL

## 2021-01-01 PROCEDURE — G0498 CHEMO EXTEND IV INFUS W/PUMP: HCPCS

## 2021-01-01 PROCEDURE — 74177 CT ABD & PELVIS W/CONTRAST: CPT

## 2021-01-01 PROCEDURE — 96376 TX/PRO/DX INJ SAME DRUG ADON: CPT

## 2021-01-01 PROCEDURE — A9575 INJ GADOTERATE MEGLUMI 0.1ML: HCPCS | Performed by: GENERAL ACUTE CARE HOSPITAL

## 2021-01-01 PROCEDURE — 76060000033 HC ANESTHESIA 1 TO 1.5 HR: Performed by: INTERNAL MEDICINE

## 2021-01-01 PROCEDURE — 88307 TISSUE EXAM BY PATHOLOGIST: CPT

## 2021-01-01 PROCEDURE — 88112 CYTOPATH CELL ENHANCE TECH: CPT

## 2021-01-01 PROCEDURE — 76210000006 HC OR PH I REC 0.5 TO 1 HR: Performed by: INTERNAL MEDICINE

## 2021-01-01 PROCEDURE — 85027 COMPLETE CBC AUTOMATED: CPT

## 2021-01-01 PROCEDURE — 83605 ASSAY OF LACTIC ACID: CPT

## 2021-01-01 PROCEDURE — 74011250636 HC RX REV CODE- 250/636: Performed by: GENERAL ACUTE CARE HOSPITAL

## 2021-01-01 PROCEDURE — 1090F PRES/ABSN URINE INCON ASSESS: CPT | Performed by: INTERNAL MEDICINE

## 2021-01-01 PROCEDURE — 65270000015 HC RM PRIVATE ONCOLOGY

## 2021-01-01 PROCEDURE — G8432 DEP SCR NOT DOC, RNG: HCPCS | Performed by: INTERNAL MEDICINE

## 2021-01-01 PROCEDURE — 86301 IMMUNOASSAY TUMOR CA 19-9: CPT

## 2021-01-01 PROCEDURE — G8419 CALC BMI OUT NRM PARAM NOF/U: HCPCS | Performed by: NURSE PRACTITIONER

## 2021-01-01 PROCEDURE — 88360 TUMOR IMMUNOHISTOCHEM/MANUAL: CPT

## 2021-01-01 PROCEDURE — 36591 DRAW BLOOD OFF VENOUS DEVICE: CPT

## 2021-01-01 PROCEDURE — G8432 DEP SCR NOT DOC, RNG: HCPCS | Performed by: STUDENT IN AN ORGANIZED HEALTH CARE EDUCATION/TRAINING PROGRAM

## 2021-01-01 PROCEDURE — A9552 F18 FDG: HCPCS

## 2021-01-01 PROCEDURE — 99204 OFFICE O/P NEW MOD 45 MIN: CPT | Performed by: INTERNAL MEDICINE

## 2021-01-01 PROCEDURE — 88333 PATH CONSLTJ SURG CYTO XM 1: CPT

## 2021-01-01 PROCEDURE — 3017F COLORECTAL CA SCREEN DOC REV: CPT | Performed by: INTERNAL MEDICINE

## 2021-01-01 PROCEDURE — 96372 THER/PROPH/DIAG INJ SC/IM: CPT

## 2021-01-01 PROCEDURE — G0463 HOSPITAL OUTPT CLINIC VISIT: HCPCS | Performed by: STUDENT IN AN ORGANIZED HEALTH CARE EDUCATION/TRAINING PROGRAM

## 2021-01-01 PROCEDURE — G9899 SCRN MAM PERF RSLTS DOC: HCPCS | Performed by: NURSE PRACTITIONER

## 2021-01-01 PROCEDURE — G8419 CALC BMI OUT NRM PARAM NOF/U: HCPCS | Performed by: STUDENT IN AN ORGANIZED HEALTH CARE EDUCATION/TRAINING PROGRAM

## 2021-01-01 PROCEDURE — 3017F COLORECTAL CA SCREEN DOC REV: CPT | Performed by: STUDENT IN AN ORGANIZED HEALTH CARE EDUCATION/TRAINING PROGRAM

## 2021-01-01 PROCEDURE — 1101F PT FALLS ASSESS-DOCD LE1/YR: CPT | Performed by: STUDENT IN AN ORGANIZED HEALTH CARE EDUCATION/TRAINING PROGRAM

## 2021-01-01 PROCEDURE — 1111F DSCHRG MED/CURRENT MED MERGE: CPT | Performed by: STUDENT IN AN ORGANIZED HEALTH CARE EDUCATION/TRAINING PROGRAM

## 2021-01-01 PROCEDURE — 74011250637 HC RX REV CODE- 250/637: Performed by: EMERGENCY MEDICINE

## 2021-01-01 PROCEDURE — 77030009038 HC CATH BILI STN RTVR BSC -C: Performed by: INTERNAL MEDICINE

## 2021-01-01 PROCEDURE — 96411 CHEMO IV PUSH ADDL DRUG: CPT

## 2021-01-01 PROCEDURE — 1090F PRES/ABSN URINE INCON ASSESS: CPT | Performed by: NURSE PRACTITIONER

## 2021-01-01 PROCEDURE — 2709999900 HC NON-CHARGEABLE SUPPLY: Performed by: INTERNAL MEDICINE

## 2021-01-01 PROCEDURE — 1090F PRES/ABSN URINE INCON ASSESS: CPT | Performed by: STUDENT IN AN ORGANIZED HEALTH CARE EDUCATION/TRAINING PROGRAM

## 2021-01-01 PROCEDURE — G8432 DEP SCR NOT DOC, RNG: HCPCS | Performed by: NURSE PRACTITIONER

## 2021-01-01 PROCEDURE — 96365 THER/PROPH/DIAG IV INF INIT: CPT

## 2021-01-01 PROCEDURE — 77030012965 HC NDL HUBR BBMI -A

## 2021-01-01 PROCEDURE — 81001 URINALYSIS AUTO W/SCOPE: CPT

## 2021-01-01 PROCEDURE — G9899 SCRN MAM PERF RSLTS DOC: HCPCS | Performed by: STUDENT IN AN ORGANIZED HEALTH CARE EDUCATION/TRAINING PROGRAM

## 2021-01-01 PROCEDURE — 88334 PATH CONSLTJ SURG CYTO XM EA: CPT

## 2021-01-01 PROCEDURE — 85610 PROTHROMBIN TIME: CPT

## 2021-01-01 PROCEDURE — G8400 PT W/DXA NO RESULTS DOC: HCPCS | Performed by: NURSE PRACTITIONER

## 2021-01-01 PROCEDURE — 74011000258 HC RX REV CODE- 258: Performed by: GENERAL ACUTE CARE HOSPITAL

## 2021-01-01 PROCEDURE — 74011250637 HC RX REV CODE- 250/637: Performed by: INTERNAL MEDICINE

## 2021-01-01 PROCEDURE — 82728 ASSAY OF FERRITIN: CPT

## 2021-01-01 PROCEDURE — 82746 ASSAY OF FOLIC ACID SERUM: CPT

## 2021-01-01 PROCEDURE — G8400 PT W/DXA NO RESULTS DOC: HCPCS | Performed by: INTERNAL MEDICINE

## 2021-01-01 PROCEDURE — G8428 CUR MEDS NOT DOCUMENT: HCPCS | Performed by: INTERNAL MEDICINE

## 2021-01-01 PROCEDURE — 77066 DX MAMMO INCL CAD BI: CPT

## 2021-01-01 PROCEDURE — 93971 EXTREMITY STUDY: CPT

## 2021-01-01 PROCEDURE — 77001 FLUOROGUIDE FOR VEIN DEVICE: CPT

## 2021-01-01 PROCEDURE — 96377 APPLICATON ON-BODY INJECTOR: CPT

## 2021-01-01 PROCEDURE — 74011250637 HC RX REV CODE- 250/637: Performed by: STUDENT IN AN ORGANIZED HEALTH CARE EDUCATION/TRAINING PROGRAM

## 2021-01-01 PROCEDURE — 99211 OFF/OP EST MAY X REQ PHY/QHP: CPT

## 2021-01-01 PROCEDURE — G8427 DOCREV CUR MEDS BY ELIG CLIN: HCPCS | Performed by: NURSE PRACTITIONER

## 2021-01-01 PROCEDURE — 88342 IMHCHEM/IMCYTCHM 1ST ANTB: CPT

## 2021-01-01 PROCEDURE — 85049 AUTOMATED PLATELET COUNT: CPT

## 2021-01-01 PROCEDURE — 77030007288 HC DEV LOK BILI BSC -A: Performed by: INTERNAL MEDICINE

## 2021-01-01 PROCEDURE — 96417 CHEMO IV INFUS EACH ADDL SEQ: CPT

## 2021-01-01 PROCEDURE — G8400 PT W/DXA NO RESULTS DOC: HCPCS | Performed by: STUDENT IN AN ORGANIZED HEALTH CARE EDUCATION/TRAINING PROGRAM

## 2021-01-01 PROCEDURE — 76642 ULTRASOUND BREAST LIMITED: CPT

## 2021-01-01 PROCEDURE — 96367 TX/PROPH/DG ADDL SEQ IV INF: CPT

## 2021-01-01 PROCEDURE — 83540 ASSAY OF IRON: CPT

## 2021-01-01 PROCEDURE — 99283 EMERGENCY DEPT VISIT LOW MDM: CPT

## 2021-01-01 PROCEDURE — 96416 CHEMO PROLONG INFUSE W/PUMP: CPT

## 2021-01-01 PROCEDURE — 74011250636 HC RX REV CODE- 250/636: Performed by: NURSE ANESTHETIST, CERTIFIED REGISTERED

## 2021-01-01 PROCEDURE — G8536 NO DOC ELDER MAL SCRN: HCPCS | Performed by: NURSE PRACTITIONER

## 2021-01-01 PROCEDURE — 74011000258 HC RX REV CODE- 258: Performed by: INTERNAL MEDICINE

## 2021-01-01 PROCEDURE — 0F768DZ DILATION OF LEFT HEPATIC DUCT WITH INTRALUMINAL DEVICE, VIA NATURAL OR ARTIFICIAL OPENING ENDOSCOPIC: ICD-10-PCS | Performed by: INTERNAL MEDICINE

## 2021-01-01 PROCEDURE — G8536 NO DOC ELDER MAL SCRN: HCPCS | Performed by: STUDENT IN AN ORGANIZED HEALTH CARE EDUCATION/TRAINING PROGRAM

## 2021-01-01 PROCEDURE — 74011000250 HC RX REV CODE- 250: Performed by: RADIOLOGY

## 2021-01-01 PROCEDURE — 77030026438 HC STYL ET INTUB CARD -A: Performed by: NURSE ANESTHETIST, CERTIFIED REGISTERED

## 2021-01-01 PROCEDURE — 96523 IRRIG DRUG DELIVERY DEVICE: CPT

## 2021-01-01 PROCEDURE — 77030040361 HC SLV COMPR DVT MDII -B: Performed by: INTERNAL MEDICINE

## 2021-01-01 PROCEDURE — 87340 HEPATITIS B SURFACE AG IA: CPT

## 2021-01-01 PROCEDURE — 74011000636 HC RX REV CODE- 636: Performed by: INTERNAL MEDICINE

## 2021-01-01 PROCEDURE — 85730 THROMBOPLASTIN TIME PARTIAL: CPT

## 2021-01-01 PROCEDURE — G8536 NO DOC ELDER MAL SCRN: HCPCS | Performed by: INTERNAL MEDICINE

## 2021-01-01 PROCEDURE — C1874 STENT, COATED/COV W/DEL SYS: HCPCS | Performed by: INTERNAL MEDICINE

## 2021-01-01 PROCEDURE — 77030041276 HC SPHNTOM BILI BSC -F: Performed by: INTERNAL MEDICINE

## 2021-01-01 PROCEDURE — 96368 THER/DIAG CONCURRENT INF: CPT

## 2021-01-01 PROCEDURE — G0463 HOSPITAL OUTPT CLINIC VISIT: HCPCS | Performed by: NURSE PRACTITIONER

## 2021-01-01 PROCEDURE — 86705 HEP B CORE ANTIBODY IGM: CPT

## 2021-01-01 DEVICE — STENT SYSTEM RMV
Type: IMPLANTABLE DEVICE | Site: ABDOMEN | Status: FUNCTIONAL
Brand: WALLFLEX BILIARY

## 2021-01-01 RX ORDER — EPINEPHRINE 1 MG/ML
0.3 INJECTION, SOLUTION, CONCENTRATE INTRAVENOUS AS NEEDED
Status: CANCELLED | OUTPATIENT
Start: 2021-01-01

## 2021-01-01 RX ORDER — FENTANYL CITRATE 50 UG/ML
25 INJECTION, SOLUTION INTRAMUSCULAR; INTRAVENOUS
Status: CANCELLED | OUTPATIENT
Start: 2021-01-01 | End: 2021-01-01

## 2021-01-01 RX ORDER — FENTANYL CITRATE 50 UG/ML
25 INJECTION, SOLUTION INTRAMUSCULAR; INTRAVENOUS
Status: DISCONTINUED | OUTPATIENT
Start: 2021-01-01 | End: 2021-01-01 | Stop reason: HOSPADM

## 2021-01-01 RX ORDER — SODIUM CHLORIDE 0.9 % (FLUSH) 0.9 %
10-40 SYRINGE (ML) INJECTION AS NEEDED
Status: DISCONTINUED | OUTPATIENT
Start: 2021-01-01 | End: 2021-01-01 | Stop reason: HOSPADM

## 2021-01-01 RX ORDER — FAMOTIDINE 20 MG/1
20 TABLET, FILM COATED ORAL DAILY
Qty: 30 TABLET | Refills: 0 | Status: SHIPPED | OUTPATIENT
Start: 2021-01-01 | End: 2021-01-01 | Stop reason: SDUPTHER

## 2021-01-01 RX ORDER — DIPHENHYDRAMINE HYDROCHLORIDE 50 MG/ML
50 INJECTION, SOLUTION INTRAMUSCULAR; INTRAVENOUS AS NEEDED
Status: CANCELLED
Start: 2021-01-01

## 2021-01-01 RX ORDER — ONDANSETRON 2 MG/ML
4 INJECTION INTRAMUSCULAR; INTRAVENOUS
Status: DISCONTINUED | OUTPATIENT
Start: 2021-01-01 | End: 2021-01-01 | Stop reason: HOSPADM

## 2021-01-01 RX ORDER — MORPHINE SULFATE 4 MG/ML
4 INJECTION INTRAVENOUS
Status: DISCONTINUED | OUTPATIENT
Start: 2021-01-01 | End: 2021-01-01 | Stop reason: HOSPADM

## 2021-01-01 RX ORDER — SODIUM CHLORIDE 9 MG/ML
125 INJECTION, SOLUTION INTRAVENOUS CONTINUOUS
Status: CANCELLED | OUTPATIENT
Start: 2021-01-01

## 2021-01-01 RX ORDER — ONDANSETRON 2 MG/ML
INJECTION INTRAMUSCULAR; INTRAVENOUS AS NEEDED
Status: DISCONTINUED | OUTPATIENT
Start: 2021-01-01 | End: 2021-01-01 | Stop reason: HOSPADM

## 2021-01-01 RX ORDER — LOPERAMIDE HCL 2 MG
TABLET ORAL
Qty: 30 TABLET | Refills: 2 | Status: SHIPPED | OUTPATIENT
Start: 2021-01-01

## 2021-01-01 RX ORDER — SENNOSIDES 8.6 MG/1
1 TABLET ORAL DAILY
Status: DISCONTINUED | OUTPATIENT
Start: 2021-01-01 | End: 2021-01-01 | Stop reason: HOSPADM

## 2021-01-01 RX ORDER — ACETAMINOPHEN 650 MG/1
650 SUPPOSITORY RECTAL
Status: DISCONTINUED | OUTPATIENT
Start: 2021-01-01 | End: 2021-01-01 | Stop reason: HOSPADM

## 2021-01-01 RX ORDER — FENTANYL CITRATE 50 UG/ML
50 INJECTION, SOLUTION INTRAMUSCULAR; INTRAVENOUS AS NEEDED
Status: DISCONTINUED | OUTPATIENT
Start: 2021-01-01 | End: 2021-01-01 | Stop reason: HOSPADM

## 2021-01-01 RX ORDER — ONDANSETRON 2 MG/ML
8 INJECTION INTRAMUSCULAR; INTRAVENOUS AS NEEDED
Status: CANCELLED | OUTPATIENT
Start: 2021-01-01

## 2021-01-01 RX ORDER — NALOXONE HYDROCHLORIDE 0.4 MG/ML
0.2 INJECTION, SOLUTION INTRAMUSCULAR; INTRAVENOUS; SUBCUTANEOUS
Status: DISCONTINUED | OUTPATIENT
Start: 2021-01-01 | End: 2021-01-01 | Stop reason: HOSPADM

## 2021-01-01 RX ORDER — HEPARIN 100 UNIT/ML
300-500 SYRINGE INTRAVENOUS AS NEEDED
Status: CANCELLED
Start: 2021-01-01

## 2021-01-01 RX ORDER — SODIUM CHLORIDE 9 MG/ML
10 INJECTION INTRAMUSCULAR; INTRAVENOUS; SUBCUTANEOUS AS NEEDED
Status: CANCELLED | OUTPATIENT
Start: 2021-01-01

## 2021-01-01 RX ORDER — FENTANYL 25 UG/1
1 PATCH TRANSDERMAL
Qty: 10 PATCH | Refills: 0 | Status: SHIPPED | OUTPATIENT
Start: 2021-01-01 | End: 2022-01-12

## 2021-01-01 RX ORDER — ACETAMINOPHEN 325 MG/1
650 TABLET ORAL AS NEEDED
Status: CANCELLED
Start: 2021-01-01

## 2021-01-01 RX ORDER — LEVOFLOXACIN 5 MG/ML
500 INJECTION, SOLUTION INTRAVENOUS ONCE
Status: COMPLETED | OUTPATIENT
Start: 2021-01-01 | End: 2021-01-01

## 2021-01-01 RX ORDER — ATROPINE SULFATE 0.1 MG/ML
0.5 INJECTION INTRAVENOUS
Status: CANCELLED | OUTPATIENT
Start: 2021-01-01 | End: 2021-01-01

## 2021-01-01 RX ORDER — LIDOCAINE HYDROCHLORIDE 10 MG/ML
30 INJECTION, SOLUTION EPIDURAL; INFILTRATION; INTRACAUDAL; PERINEURAL ONCE
Status: DISCONTINUED | OUTPATIENT
Start: 2021-01-01 | End: 2021-01-01 | Stop reason: HOSPADM

## 2021-01-01 RX ORDER — PALONOSETRON 0.05 MG/ML
0.25 INJECTION, SOLUTION INTRAVENOUS ONCE
Status: COMPLETED | OUTPATIENT
Start: 2021-01-01 | End: 2021-01-01

## 2021-01-01 RX ORDER — ONDANSETRON HYDROCHLORIDE 8 MG/1
TABLET, FILM COATED ORAL
Qty: 30 TABLET | Refills: 1 | Status: SHIPPED | OUTPATIENT
Start: 2021-01-01

## 2021-01-01 RX ORDER — ALBUTEROL SULFATE 0.83 MG/ML
2.5 SOLUTION RESPIRATORY (INHALATION) AS NEEDED
Status: CANCELLED
Start: 2021-01-01

## 2021-01-01 RX ORDER — SODIUM CHLORIDE 0.9 % (FLUSH) 0.9 %
10-40 SYRINGE (ML) INJECTION AS NEEDED
Status: CANCELLED | OUTPATIENT
Start: 2021-01-01

## 2021-01-01 RX ORDER — FLUMAZENIL 0.1 MG/ML
0.2 INJECTION INTRAVENOUS
Status: CANCELLED | OUTPATIENT
Start: 2021-01-01 | End: 2021-01-01

## 2021-01-01 RX ORDER — DEXTROSE MONOHYDRATE AND SODIUM CHLORIDE 5; .9 G/100ML; G/100ML
50 INJECTION, SOLUTION INTRAVENOUS CONTINUOUS
Status: DISPENSED | OUTPATIENT
Start: 2021-01-01 | End: 2021-01-01

## 2021-01-01 RX ORDER — OXYCODONE HYDROCHLORIDE 5 MG/1
5 TABLET ORAL
Qty: 42 TABLET | Refills: 0 | Status: SHIPPED | OUTPATIENT
Start: 2021-01-01 | End: 2021-01-01

## 2021-01-01 RX ORDER — SENNOSIDES 8.6 MG/1
1 TABLET ORAL DAILY
Qty: 30 TABLET | Refills: 0 | OUTPATIENT
Start: 2021-01-01 | End: 2021-01-01

## 2021-01-01 RX ORDER — ONDANSETRON 2 MG/ML
4 INJECTION INTRAMUSCULAR; INTRAVENOUS AS NEEDED
Status: DISCONTINUED | OUTPATIENT
Start: 2021-01-01 | End: 2021-01-01 | Stop reason: HOSPADM

## 2021-01-01 RX ORDER — SODIUM CHLORIDE 0.9 % (FLUSH) 0.9 %
5-40 SYRINGE (ML) INJECTION AS NEEDED
Status: DISCONTINUED | OUTPATIENT
Start: 2021-01-01 | End: 2021-01-01 | Stop reason: HOSPADM

## 2021-01-01 RX ORDER — DIPHENHYDRAMINE HYDROCHLORIDE 50 MG/ML
25 INJECTION, SOLUTION INTRAMUSCULAR; INTRAVENOUS AS NEEDED
Status: CANCELLED
Start: 2021-01-01

## 2021-01-01 RX ORDER — FAMOTIDINE 20 MG/1
20 TABLET, FILM COATED ORAL DAILY
Status: DISCONTINUED | OUTPATIENT
Start: 2021-01-01 | End: 2021-01-01 | Stop reason: HOSPADM

## 2021-01-01 RX ORDER — MIDAZOLAM HYDROCHLORIDE 1 MG/ML
1 INJECTION, SOLUTION INTRAMUSCULAR; INTRAVENOUS AS NEEDED
Status: DISCONTINUED | OUTPATIENT
Start: 2021-01-01 | End: 2021-01-01 | Stop reason: HOSPADM

## 2021-01-01 RX ORDER — ENOXAPARIN SODIUM 100 MG/ML
40 INJECTION SUBCUTANEOUS EVERY 24 HOURS
Status: DISCONTINUED | OUTPATIENT
Start: 2021-01-01 | End: 2021-01-01 | Stop reason: DRUGHIGH

## 2021-01-01 RX ORDER — SODIUM CHLORIDE 9 MG/ML
25 INJECTION, SOLUTION INTRAVENOUS CONTINUOUS
Status: DISCONTINUED | OUTPATIENT
Start: 2021-01-01 | End: 2021-01-01 | Stop reason: HOSPADM

## 2021-01-01 RX ORDER — POLYETHYLENE GLYCOL 3350 17 G/17G
17 POWDER, FOR SOLUTION ORAL DAILY
Qty: 289 G | Refills: 0 | Status: SHIPPED | OUTPATIENT
Start: 2021-01-01 | End: 2021-01-01 | Stop reason: SDUPTHER

## 2021-01-01 RX ORDER — ACETAMINOPHEN 325 MG/1
650 TABLET ORAL
Status: DISCONTINUED | OUTPATIENT
Start: 2021-01-01 | End: 2021-01-01 | Stop reason: HOSPADM

## 2021-01-01 RX ORDER — OXYCODONE HYDROCHLORIDE 5 MG/1
5 TABLET ORAL
COMMUNITY
End: 2021-01-01 | Stop reason: SDUPTHER

## 2021-01-01 RX ORDER — HYDROCORTISONE SODIUM SUCCINATE 100 MG/2ML
100 INJECTION, POWDER, FOR SOLUTION INTRAMUSCULAR; INTRAVENOUS AS NEEDED
Status: CANCELLED | OUTPATIENT
Start: 2021-01-01

## 2021-01-01 RX ORDER — LIDOCAINE HYDROCHLORIDE AND EPINEPHRINE 20; 5 MG/ML; UG/ML
20 INJECTION, SOLUTION EPIDURAL; INFILTRATION; INTRACAUDAL; PERINEURAL ONCE
Status: COMPLETED | OUTPATIENT
Start: 2021-01-01 | End: 2021-01-01

## 2021-01-01 RX ORDER — DICYCLOMINE HYDROCHLORIDE 10 MG/1
20 CAPSULE ORAL
Status: COMPLETED | OUTPATIENT
Start: 2021-01-01 | End: 2021-01-01

## 2021-01-01 RX ORDER — POLYETHYLENE GLYCOL 3350 17 G/17G
17 POWDER, FOR SOLUTION ORAL DAILY
Qty: 289 G | Refills: 0 | Status: SHIPPED | OUTPATIENT
Start: 2021-01-01

## 2021-01-01 RX ORDER — GADOTERATE MEGLUMINE 376.9 MG/ML
10 INJECTION INTRAVENOUS
Status: COMPLETED | OUTPATIENT
Start: 2021-01-01 | End: 2021-01-01

## 2021-01-01 RX ORDER — HYDROMORPHONE HYDROCHLORIDE 1 MG/ML
.2-.5 INJECTION, SOLUTION INTRAMUSCULAR; INTRAVENOUS; SUBCUTANEOUS
Status: DISCONTINUED | OUTPATIENT
Start: 2021-01-01 | End: 2021-01-01 | Stop reason: HOSPADM

## 2021-01-01 RX ORDER — SODIUM CHLORIDE 9 MG/ML
20 INJECTION, SOLUTION INTRAVENOUS CONTINUOUS
Status: DISCONTINUED | OUTPATIENT
Start: 2021-01-01 | End: 2021-01-01 | Stop reason: HOSPADM

## 2021-01-01 RX ORDER — SENNOSIDES 8.6 MG/1
1 TABLET ORAL DAILY
Qty: 30 TABLET | Refills: 0 | Status: SHIPPED | OUTPATIENT
Start: 2021-01-01 | End: 2021-01-01 | Stop reason: SDUPTHER

## 2021-01-01 RX ORDER — FLUDEOXYGLUCOSE F-18 200 MCI/ML
6.97 INJECTION INTRAVENOUS ONCE
Status: COMPLETED | OUTPATIENT
Start: 2021-01-01 | End: 2021-01-01

## 2021-01-01 RX ORDER — IODIXANOL 320 MG/ML
1-50 INJECTION, SOLUTION INTRAVASCULAR
Status: DISCONTINUED | OUTPATIENT
Start: 2021-01-01 | End: 2021-01-01 | Stop reason: HOSPADM

## 2021-01-01 RX ORDER — BISMUTH SUBSALICYLATE 262 MG
1 TABLET,CHEWABLE ORAL DAILY
COMMUNITY

## 2021-01-01 RX ORDER — SODIUM CHLORIDE 9 MG/ML
250 INJECTION, SOLUTION INTRAVENOUS CONTINUOUS
Status: DISCONTINUED | OUTPATIENT
Start: 2021-01-01 | End: 2021-01-01 | Stop reason: HOSPADM

## 2021-01-01 RX ORDER — LIDOCAINE HYDROCHLORIDE 20 MG/ML
INJECTION, SOLUTION EPIDURAL; INFILTRATION; INTRACAUDAL; PERINEURAL AS NEEDED
Status: DISCONTINUED | OUTPATIENT
Start: 2021-01-01 | End: 2021-01-01 | Stop reason: HOSPADM

## 2021-01-01 RX ORDER — SODIUM CHLORIDE 0.9 % (FLUSH) 0.9 %
5-40 SYRINGE (ML) INJECTION EVERY 8 HOURS
Status: DISCONTINUED | OUTPATIENT
Start: 2021-01-01 | End: 2021-01-01 | Stop reason: SDUPTHER

## 2021-01-01 RX ORDER — ACETAMINOPHEN 500 MG
1000 TABLET ORAL ONCE
Status: COMPLETED | OUTPATIENT
Start: 2021-01-01 | End: 2021-01-01

## 2021-01-01 RX ORDER — ONDANSETRON 4 MG/1
4 TABLET, ORALLY DISINTEGRATING ORAL
Status: DISCONTINUED | OUTPATIENT
Start: 2021-01-01 | End: 2021-01-01 | Stop reason: HOSPADM

## 2021-01-01 RX ORDER — FENTANYL 25 UG/1
1 PATCH TRANSDERMAL
Status: DISCONTINUED | OUTPATIENT
Start: 2021-01-01 | End: 2021-01-01 | Stop reason: HOSPADM

## 2021-01-01 RX ORDER — EPINEPHRINE 0.1 MG/ML
1 INJECTION INTRACARDIAC; INTRAVENOUS
Status: CANCELLED | OUTPATIENT
Start: 2021-01-01 | End: 2021-01-01

## 2021-01-01 RX ORDER — NALOXONE HYDROCHLORIDE 0.4 MG/ML
0.4 INJECTION, SOLUTION INTRAMUSCULAR; INTRAVENOUS; SUBCUTANEOUS
Status: CANCELLED | OUTPATIENT
Start: 2021-01-01 | End: 2021-01-01

## 2021-01-01 RX ORDER — PALONOSETRON 0.05 MG/ML
0.25 INJECTION, SOLUTION INTRAVENOUS ONCE
Status: CANCELLED | OUTPATIENT
Start: 2021-01-01 | End: 2021-01-01

## 2021-01-01 RX ORDER — FLUMAZENIL 0.1 MG/ML
0.2 INJECTION INTRAVENOUS
Status: DISCONTINUED | OUTPATIENT
Start: 2021-01-01 | End: 2021-01-01 | Stop reason: HOSPADM

## 2021-01-01 RX ORDER — AMOXICILLIN AND CLAVULANATE POTASSIUM 500; 125 MG/1; MG/1
1 TABLET, FILM COATED ORAL EVERY 12 HOURS
Qty: 14 TABLET | Refills: 0 | Status: SHIPPED | OUTPATIENT
Start: 2021-01-01 | End: 2021-01-01

## 2021-01-01 RX ORDER — MORPHINE SULFATE 4 MG/ML
4 INJECTION INTRAVENOUS ONCE
Status: COMPLETED | OUTPATIENT
Start: 2021-01-01 | End: 2021-01-01

## 2021-01-01 RX ORDER — SODIUM CHLORIDE, SODIUM LACTATE, POTASSIUM CHLORIDE, CALCIUM CHLORIDE 600; 310; 30; 20 MG/100ML; MG/100ML; MG/100ML; MG/100ML
25 INJECTION, SOLUTION INTRAVENOUS CONTINUOUS
Status: DISCONTINUED | OUTPATIENT
Start: 2021-01-01 | End: 2021-01-01 | Stop reason: HOSPADM

## 2021-01-01 RX ORDER — MIDAZOLAM HYDROCHLORIDE 1 MG/ML
.25-5 INJECTION, SOLUTION INTRAMUSCULAR; INTRAVENOUS
Status: CANCELLED | OUTPATIENT
Start: 2021-01-01 | End: 2021-01-01

## 2021-01-01 RX ORDER — GLYCOPYRROLATE 0.2 MG/ML
INJECTION INTRAMUSCULAR; INTRAVENOUS AS NEEDED
Status: DISCONTINUED | OUTPATIENT
Start: 2021-01-01 | End: 2021-01-01 | Stop reason: HOSPADM

## 2021-01-01 RX ORDER — DEXAMETHASONE SODIUM PHOSPHATE 4 MG/ML
INJECTION, SOLUTION INTRA-ARTICULAR; INTRALESIONAL; INTRAMUSCULAR; INTRAVENOUS; SOFT TISSUE AS NEEDED
Status: DISCONTINUED | OUTPATIENT
Start: 2021-01-01 | End: 2021-01-01 | Stop reason: HOSPADM

## 2021-01-01 RX ORDER — DEXTROSE MONOHYDRATE 50 MG/ML
25 INJECTION, SOLUTION INTRAVENOUS CONTINUOUS
Status: CANCELLED | OUTPATIENT
Start: 2021-01-01

## 2021-01-01 RX ORDER — DEXTROSE MONOHYDRATE 50 MG/ML
25 INJECTION, SOLUTION INTRAVENOUS CONTINUOUS
Status: DISPENSED | OUTPATIENT
Start: 2021-01-01 | End: 2021-01-01

## 2021-01-01 RX ORDER — MORPHINE SULFATE 2 MG/ML
2 INJECTION, SOLUTION INTRAMUSCULAR; INTRAVENOUS
Status: DISCONTINUED | OUTPATIENT
Start: 2021-01-01 | End: 2021-01-01 | Stop reason: HOSPADM

## 2021-01-01 RX ORDER — HEPARIN 100 UNIT/ML
500 SYRINGE INTRAVENOUS AS NEEDED
Status: CANCELLED | OUTPATIENT
Start: 2021-01-01

## 2021-01-01 RX ORDER — SODIUM CHLORIDE 9 MG/ML
1000 INJECTION, SOLUTION INTRAVENOUS CONTINUOUS
Status: DISCONTINUED | OUTPATIENT
Start: 2021-01-01 | End: 2021-01-01 | Stop reason: HOSPADM

## 2021-01-01 RX ORDER — MORPHINE SULFATE 4 MG/ML
4 INJECTION INTRAVENOUS
Status: COMPLETED | OUTPATIENT
Start: 2021-01-01 | End: 2021-01-01

## 2021-01-01 RX ORDER — FENTANYL CITRATE 50 UG/ML
INJECTION, SOLUTION INTRAMUSCULAR; INTRAVENOUS AS NEEDED
Status: DISCONTINUED | OUTPATIENT
Start: 2021-01-01 | End: 2021-01-01 | Stop reason: HOSPADM

## 2021-01-01 RX ORDER — HEPARIN SODIUM 5000 [USP'U]/ML
5000 INJECTION, SOLUTION INTRAVENOUS; SUBCUTANEOUS EVERY 12 HOURS
Status: DISCONTINUED | OUTPATIENT
Start: 2021-01-01 | End: 2021-01-01 | Stop reason: HOSPADM

## 2021-01-01 RX ORDER — MIDAZOLAM HYDROCHLORIDE 1 MG/ML
.5-2 INJECTION, SOLUTION INTRAMUSCULAR; INTRAVENOUS
Status: DISPENSED | OUTPATIENT
Start: 2021-01-01 | End: 2021-01-01

## 2021-01-01 RX ORDER — LIDOCAINE HYDROCHLORIDE 10 MG/ML
0.1 INJECTION, SOLUTION EPIDURAL; INFILTRATION; INTRACAUDAL; PERINEURAL AS NEEDED
Status: DISCONTINUED | OUTPATIENT
Start: 2021-01-01 | End: 2021-01-01 | Stop reason: HOSPADM

## 2021-01-01 RX ORDER — FLUOROURACIL 50 MG/ML
600 INJECTION, SOLUTION INTRAVENOUS ONCE
Status: COMPLETED | OUTPATIENT
Start: 2021-01-01 | End: 2021-01-01

## 2021-01-01 RX ORDER — SODIUM CHLORIDE 0.9 % (FLUSH) 0.9 %
10 SYRINGE (ML) INJECTION AS NEEDED
Status: CANCELLED | OUTPATIENT
Start: 2021-01-01

## 2021-01-01 RX ORDER — LIDOCAINE AND PRILOCAINE 25; 25 MG/G; MG/G
CREAM TOPICAL
Qty: 30 G | Refills: 3 | Status: SHIPPED | OUTPATIENT
Start: 2021-01-01

## 2021-01-01 RX ORDER — HEPARIN 100 UNIT/ML
500 SYRINGE INTRAVENOUS ONCE
Status: COMPLETED | OUTPATIENT
Start: 2021-01-01 | End: 2021-01-01

## 2021-01-01 RX ORDER — FENTANYL CITRATE 50 UG/ML
12.5-5 INJECTION, SOLUTION INTRAMUSCULAR; INTRAVENOUS
Status: DISPENSED | OUTPATIENT
Start: 2021-01-01 | End: 2021-01-01

## 2021-01-01 RX ORDER — PROPOFOL 10 MG/ML
INJECTION, EMULSION INTRAVENOUS AS NEEDED
Status: DISCONTINUED | OUTPATIENT
Start: 2021-01-01 | End: 2021-01-01 | Stop reason: HOSPADM

## 2021-01-01 RX ORDER — ONDANSETRON 2 MG/ML
4 INJECTION INTRAMUSCULAR; INTRAVENOUS ONCE
Status: COMPLETED | OUTPATIENT
Start: 2021-01-01 | End: 2021-01-01

## 2021-01-01 RX ORDER — SUCCINYLCHOLINE CHLORIDE 20 MG/ML
INJECTION INTRAMUSCULAR; INTRAVENOUS AS NEEDED
Status: DISCONTINUED | OUTPATIENT
Start: 2021-01-01 | End: 2021-01-01 | Stop reason: HOSPADM

## 2021-01-01 RX ORDER — OXYCODONE HYDROCHLORIDE 5 MG/1
5 TABLET ORAL
Status: DISCONTINUED | OUTPATIENT
Start: 2021-01-01 | End: 2021-01-01 | Stop reason: HOSPADM

## 2021-01-01 RX ORDER — SODIUM CHLORIDE 9 MG/ML
25 INJECTION, SOLUTION INTRAVENOUS CONTINUOUS
Status: CANCELLED | OUTPATIENT
Start: 2021-01-01

## 2021-01-01 RX ORDER — POLYETHYLENE GLYCOL 3350 17 G/17G
17 POWDER, FOR SOLUTION ORAL DAILY
Status: DISCONTINUED | OUTPATIENT
Start: 2021-01-01 | End: 2021-01-01 | Stop reason: HOSPADM

## 2021-01-01 RX ORDER — HEPARIN SODIUM (PORCINE) LOCK FLUSH IV SOLN 100 UNIT/ML 100 UNIT/ML
500 SOLUTION INTRAVENOUS AS NEEDED
Status: CANCELLED | OUTPATIENT
Start: 2021-01-01

## 2021-01-01 RX ORDER — SODIUM CHLORIDE 0.9 % (FLUSH) 0.9 %
5-40 SYRINGE (ML) INJECTION EVERY 8 HOURS
Status: DISCONTINUED | OUTPATIENT
Start: 2021-01-01 | End: 2021-01-01 | Stop reason: HOSPADM

## 2021-01-01 RX ORDER — HEPARIN 100 UNIT/ML
300-500 SYRINGE INTRAVENOUS AS NEEDED
Status: DISCONTINUED | OUTPATIENT
Start: 2021-01-01 | End: 2021-01-01 | Stop reason: HOSPADM

## 2021-01-01 RX ORDER — FLUOROURACIL 50 MG/ML
600 INJECTION, SOLUTION INTRAVENOUS ONCE
Status: CANCELLED
Start: 2021-01-01 | End: 2021-01-01

## 2021-01-01 RX ORDER — ONDANSETRON 2 MG/ML
4 INJECTION INTRAMUSCULAR; INTRAVENOUS
Status: COMPLETED | OUTPATIENT
Start: 2021-01-01 | End: 2021-01-01

## 2021-01-01 RX ORDER — FAMOTIDINE 20 MG/1
20 TABLET, FILM COATED ORAL ONCE
Status: COMPLETED | OUTPATIENT
Start: 2021-01-01 | End: 2021-01-01

## 2021-01-01 RX ORDER — SODIUM CHLORIDE 0.9 % (FLUSH) 0.9 %
10 SYRINGE (ML) INJECTION AS NEEDED
Status: DISCONTINUED | OUTPATIENT
Start: 2021-01-01 | End: 2021-01-01 | Stop reason: HOSPADM

## 2021-01-01 RX ORDER — ENOXAPARIN SODIUM 100 MG/ML
30 INJECTION SUBCUTANEOUS EVERY 24 HOURS
Status: COMPLETED | OUTPATIENT
Start: 2021-01-01 | End: 2021-01-01

## 2021-01-01 RX ORDER — BARIUM SULFATE 20 MG/ML
450 SUSPENSION ORAL
Status: COMPLETED | OUTPATIENT
Start: 2021-01-01 | End: 2021-01-01

## 2021-01-01 RX ORDER — HEPARIN SODIUM (PORCINE) LOCK FLUSH IV SOLN 100 UNIT/ML 100 UNIT/ML
500 SOLUTION INTRAVENOUS AS NEEDED
Status: DISCONTINUED | OUTPATIENT
Start: 2021-01-01 | End: 2021-01-01 | Stop reason: HOSPADM

## 2021-01-01 RX ORDER — FENTANYL CITRATE 50 UG/ML
25 INJECTION, SOLUTION INTRAMUSCULAR; INTRAVENOUS
Status: COMPLETED | OUTPATIENT
Start: 2021-01-01 | End: 2021-01-01

## 2021-01-01 RX ORDER — OXYCODONE HYDROCHLORIDE 5 MG/1
5 TABLET ORAL
Qty: 42 TABLET | Refills: 0 | Status: SHIPPED | OUTPATIENT
Start: 2021-01-01 | End: 2021-01-01 | Stop reason: SDUPTHER

## 2021-01-01 RX ORDER — OXYCODONE HYDROCHLORIDE 5 MG/1
5 TABLET ORAL
Qty: 12 TABLET | Refills: 0 | Status: SHIPPED | OUTPATIENT
Start: 2021-01-01 | End: 2021-01-01

## 2021-01-01 RX ORDER — SODIUM CHLORIDE 0.9 % (FLUSH) 0.9 %
5-40 SYRINGE (ML) INJECTION AS NEEDED
Status: DISCONTINUED | OUTPATIENT
Start: 2021-01-01 | End: 2021-01-01 | Stop reason: SDUPTHER

## 2021-01-01 RX ORDER — FAMOTIDINE 20 MG/1
20 TABLET, FILM COATED ORAL DAILY
Qty: 30 TABLET | Refills: 0 | Status: SHIPPED | OUTPATIENT
Start: 2021-01-01

## 2021-01-01 RX ORDER — DEXTROMETHORPHAN/PSEUDOEPHED 2.5-7.5/.8
1.2 DROPS ORAL
Status: CANCELLED | OUTPATIENT
Start: 2021-01-01

## 2021-01-01 RX ORDER — LIDOCAINE HYDROCHLORIDE AND EPINEPHRINE 20; 5 MG/ML; UG/ML
20 INJECTION, SOLUTION EPIDURAL; INFILTRATION; INTRACAUDAL; PERINEURAL ONCE
Status: CANCELLED | OUTPATIENT
Start: 2021-01-01 | End: 2021-01-01

## 2021-01-01 RX ORDER — SODIUM CHLORIDE 9 MG/ML
200 INJECTION, SOLUTION INTRAVENOUS CONTINUOUS
Status: DISCONTINUED | OUTPATIENT
Start: 2021-01-01 | End: 2021-01-01

## 2021-01-01 RX ORDER — BISACODYL 5 MG
5 TABLET, DELAYED RELEASE (ENTERIC COATED) ORAL DAILY PRN
Status: DISCONTINUED | OUTPATIENT
Start: 2021-01-01 | End: 2021-01-01 | Stop reason: HOSPADM

## 2021-01-01 RX ORDER — LORATADINE 10 MG/1
TABLET ORAL
Qty: 30 TABLET | Refills: 2 | Status: SHIPPED | OUTPATIENT
Start: 2021-01-01

## 2021-01-01 RX ORDER — SODIUM CHLORIDE 9 MG/ML
75 INJECTION, SOLUTION INTRAVENOUS CONTINUOUS
Status: DISPENSED | OUTPATIENT
Start: 2021-01-01 | End: 2021-01-01

## 2021-01-01 RX ORDER — OXYCODONE HYDROCHLORIDE 5 MG/1
5 TABLET ORAL
Qty: 84 TABLET | Refills: 0 | Status: SHIPPED | OUTPATIENT
Start: 2021-01-01 | End: 2022-01-01

## 2021-01-01 RX ORDER — SENNOSIDES 8.6 MG/1
1 TABLET ORAL DAILY
Qty: 30 TABLET | Refills: 2 | Status: SHIPPED | OUTPATIENT
Start: 2021-01-01 | End: 2022-01-01

## 2021-01-01 RX ADMIN — Medication 10 ML: at 00:06

## 2021-01-01 RX ADMIN — FENTANYL CITRATE 25 MCG: 50 INJECTION INTRAMUSCULAR; INTRAVENOUS at 13:05

## 2021-01-01 RX ADMIN — Medication 10 ML: at 16:13

## 2021-01-01 RX ADMIN — FAMOTIDINE 20 MG: 20 TABLET, FILM COATED ORAL at 08:24

## 2021-01-01 RX ADMIN — HEPARIN 500 UNITS: 100 SYRINGE at 15:30

## 2021-01-01 RX ADMIN — ENOXAPARIN SODIUM 30 MG: 40 INJECTION SUBCUTANEOUS at 15:47

## 2021-01-01 RX ADMIN — DICYCLOMINE HYDROCHLORIDE 20 MG: 10 CAPSULE ORAL at 10:19

## 2021-01-01 RX ADMIN — DEXTROSE MONOHYDRATE 25 ML/HR: 5 INJECTION, SOLUTION INTRAVENOUS at 08:40

## 2021-01-01 RX ADMIN — MIDAZOLAM 0.5 MG: 1 INJECTION INTRAMUSCULAR; INTRAVENOUS at 12:45

## 2021-01-01 RX ADMIN — LEVOFLOXACIN 500 MG: 5 INJECTION, SOLUTION INTRAVENOUS at 10:40

## 2021-01-01 RX ADMIN — FERRIC CARBOXYMALTOSE INJECTION 750 MG: 50 INJECTION, SOLUTION INTRAVENOUS at 14:32

## 2021-01-01 RX ADMIN — MIDAZOLAM 1 MG: 1 INJECTION INTRAMUSCULAR; INTRAVENOUS at 12:20

## 2021-01-01 RX ADMIN — FLUOROURACIL 3600 MG: 50 INJECTION, SOLUTION INTRAVENOUS at 13:09

## 2021-01-01 RX ADMIN — MORPHINE SULFATE 4 MG: 4 INJECTION, SOLUTION INTRAMUSCULAR; INTRAVENOUS at 11:18

## 2021-01-01 RX ADMIN — OXYCODONE 5 MG: 5 TABLET ORAL at 21:31

## 2021-01-01 RX ADMIN — FENTANYL CITRATE 25 MCG: 50 INJECTION INTRAMUSCULAR; INTRAVENOUS at 09:53

## 2021-01-01 RX ADMIN — OXYCODONE 5 MG: 5 TABLET ORAL at 05:00

## 2021-01-01 RX ADMIN — GLUCAGON HYDROCHLORIDE 30 MCG: KIT at 11:12

## 2021-01-01 RX ADMIN — FAMOTIDINE 20 MG: 20 TABLET ORAL at 10:19

## 2021-01-01 RX ADMIN — LIDOCAINE HYDROCHLORIDE 60 MG: 20 INJECTION, SOLUTION EPIDURAL; INFILTRATION; INTRACAUDAL; PERINEURAL at 10:37

## 2021-01-01 RX ADMIN — Medication 20 ML: at 15:30

## 2021-01-01 RX ADMIN — HEPARIN SODIUM 5000 UNITS: 5000 INJECTION INTRAVENOUS; SUBCUTANEOUS at 08:55

## 2021-01-01 RX ADMIN — Medication 10 ML: at 14:20

## 2021-01-01 RX ADMIN — DEXTROSE AND SODIUM CHLORIDE 50 ML/HR: 5; 900 INJECTION, SOLUTION INTRAVENOUS at 11:35

## 2021-01-01 RX ADMIN — SENNOSIDES 8.6 MG: 8.6 TABLET, FILM COATED ORAL at 08:24

## 2021-01-01 RX ADMIN — PEGFILGRASTIM 6 MG: KIT SUBCUTANEOUS at 15:03

## 2021-01-01 RX ADMIN — Medication 10 ML: at 00:10

## 2021-01-01 RX ADMIN — DEXAMETHASONE SODIUM PHOSPHATE 12 MG: 10 INJECTION INTRAMUSCULAR; INTRAVENOUS at 08:56

## 2021-01-01 RX ADMIN — ACETAMINOPHEN 1000 MG: 500 TABLET ORAL at 10:19

## 2021-01-01 RX ADMIN — GLYCOPYRROLATE 0.2 MG: 0.2 INJECTION, SOLUTION INTRAMUSCULAR; INTRAVENOUS at 10:40

## 2021-01-01 RX ADMIN — PALONOSETRON 0.25 MG: 0.05 INJECTION, SOLUTION INTRAVENOUS at 08:51

## 2021-01-01 RX ADMIN — FENTANYL CITRATE 100 MCG: 50 INJECTION, SOLUTION INTRAMUSCULAR; INTRAVENOUS at 10:40

## 2021-01-01 RX ADMIN — HEPARIN SODIUM (PORCINE) LOCK FLUSH IV SOLN 100 UNIT/ML 500 UNITS: 100 SOLUTION at 13:16

## 2021-01-01 RX ADMIN — SODIUM CHLORIDE 1000 ML: 900 INJECTION, SOLUTION INTRAVENOUS at 09:37

## 2021-01-01 RX ADMIN — Medication 1 AMPULE: at 16:13

## 2021-01-01 RX ADMIN — Medication 10 ML: at 06:02

## 2021-01-01 RX ADMIN — GADOTERATE MEGLUMINE 10 ML: 376.9 INJECTION INTRAVENOUS at 20:00

## 2021-01-01 RX ADMIN — OXYCODONE 5 MG: 5 TABLET ORAL at 14:10

## 2021-01-01 RX ADMIN — Medication 10 ML: at 13:08

## 2021-01-01 RX ADMIN — Medication 10 ML: at 21:16

## 2021-01-01 RX ADMIN — FENTANYL CITRATE 25 MCG: 50 INJECTION INTRAMUSCULAR; INTRAVENOUS at 13:15

## 2021-01-01 RX ADMIN — FENTANYL CITRATE 25 MCG: 50 INJECTION INTRAMUSCULAR; INTRAVENOUS at 12:55

## 2021-01-01 RX ADMIN — SODIUM CHLORIDE 250 ML/HR: 900 INJECTION, SOLUTION INTRAVENOUS at 10:29

## 2021-01-01 RX ADMIN — DEXTROSE AND SODIUM CHLORIDE 50 ML/HR: 5; 900 INJECTION, SOLUTION INTRAVENOUS at 19:02

## 2021-01-01 RX ADMIN — MORPHINE SULFATE 4 MG: 4 INJECTION, SOLUTION INTRAMUSCULAR; INTRAVENOUS at 20:57

## 2021-01-01 RX ADMIN — SODIUM CHLORIDE 250 ML/HR: 900 INJECTION, SOLUTION INTRAVENOUS at 15:49

## 2021-01-01 RX ADMIN — MORPHINE SULFATE 2 MG: 2 INJECTION, SOLUTION INTRAMUSCULAR; INTRAVENOUS at 02:45

## 2021-01-01 RX ADMIN — DEXAMETHASONE SODIUM PHOSPHATE 8 MG: 4 INJECTION, SOLUTION INTRAMUSCULAR; INTRAVENOUS at 10:40

## 2021-01-01 RX ADMIN — OXYCODONE 5 MG: 5 TABLET ORAL at 13:50

## 2021-01-01 RX ADMIN — MIDAZOLAM 0.5 MG: 1 INJECTION INTRAMUSCULAR; INTRAVENOUS at 12:55

## 2021-01-01 RX ADMIN — OXYCODONE 5 MG: 5 TABLET ORAL at 10:35

## 2021-01-01 RX ADMIN — Medication 1 AMPULE: at 21:07

## 2021-01-01 RX ADMIN — Medication 10 ML: at 14:11

## 2021-01-01 RX ADMIN — BARIUM SULFATE 450 ML: 20 SUSPENSION ORAL at 10:36

## 2021-01-01 RX ADMIN — FENTANYL CITRATE 50 MCG: 50 INJECTION INTRAMUSCULAR; INTRAVENOUS at 12:20

## 2021-01-01 RX ADMIN — Medication 10 ML: at 03:43

## 2021-01-01 RX ADMIN — IOPAMIDOL 100 ML: 612 INJECTION, SOLUTION INTRAVENOUS at 12:13

## 2021-01-01 RX ADMIN — PROPOFOL 200 MG: 10 INJECTION, EMULSION INTRAVENOUS at 10:37

## 2021-01-01 RX ADMIN — FENTANYL CITRATE 25 MCG: 50 INJECTION INTRAMUSCULAR; INTRAVENOUS at 12:45

## 2021-01-01 RX ADMIN — OXALIPLATIN 130 MG: 5 INJECTION, SOLUTION INTRAVENOUS at 09:55

## 2021-01-01 RX ADMIN — MORPHINE SULFATE 2 MG: 2 INJECTION, SOLUTION INTRAMUSCULAR; INTRAVENOUS at 00:06

## 2021-01-01 RX ADMIN — ENOXAPARIN SODIUM 30 MG: 40 INJECTION SUBCUTANEOUS at 15:35

## 2021-01-01 RX ADMIN — ONDANSETRON 4 MG: 2 INJECTION INTRAMUSCULAR; INTRAVENOUS at 20:57

## 2021-01-01 RX ADMIN — Medication 10 ML: at 21:09

## 2021-01-01 RX ADMIN — SODIUM CHLORIDE 1000 ML/HR: 9 INJECTION, SOLUTION INTRAVENOUS at 11:45

## 2021-01-01 RX ADMIN — LIDOCAINE HYDROCHLORIDE,EPINEPHRINE BITARTRATE 400 MG: 20; .005 INJECTION, SOLUTION EPIDURAL; INFILTRATION; INTRACAUDAL; PERINEURAL at 13:00

## 2021-01-01 RX ADMIN — ONDANSETRON 4 MG: 2 INJECTION INTRAMUSCULAR; INTRAVENOUS at 09:05

## 2021-01-01 RX ADMIN — SUCCINYLCHOLINE CHLORIDE 100 MG: 20 INJECTION, SOLUTION INTRAMUSCULAR; INTRAVENOUS at 10:37

## 2021-01-01 RX ADMIN — OXYCODONE 5 MG: 5 TABLET ORAL at 00:10

## 2021-01-01 RX ADMIN — OXYCODONE 5 MG: 5 TABLET ORAL at 23:31

## 2021-01-01 RX ADMIN — ONDANSETRON HYDROCHLORIDE 4 MG: 2 INJECTION, SOLUTION INTRAMUSCULAR; INTRAVENOUS at 10:40

## 2021-01-01 RX ADMIN — PROPOFOL 30 MG: 10 INJECTION, EMULSION INTRAVENOUS at 11:15

## 2021-01-01 RX ADMIN — MIDAZOLAM 0.5 MG: 1 INJECTION INTRAMUSCULAR; INTRAVENOUS at 13:05

## 2021-01-01 RX ADMIN — MIDAZOLAM 0.5 MG: 1 INJECTION INTRAMUSCULAR; INTRAVENOUS at 13:15

## 2021-01-01 RX ADMIN — SODIUM CHLORIDE 100 ML/HR: 900 INJECTION, SOLUTION INTRAVENOUS at 09:05

## 2021-01-01 RX ADMIN — FENTANYL CITRATE 50 MCG: 50 INJECTION INTRAMUSCULAR; INTRAVENOUS at 12:15

## 2021-01-01 RX ADMIN — LEUCOVORIN CALCIUM 600 MG: 500 INJECTION, POWDER, LYOPHILIZED, FOR SOLUTION INTRAMUSCULAR; INTRAVENOUS at 09:55

## 2021-01-01 RX ADMIN — FLUDEOXYGLUCOSE F-18 6.97 MILLICURIE: 200 INJECTION INTRAVENOUS at 10:04

## 2021-01-01 RX ADMIN — FLUOROURACIL 600 MG: 50 INJECTION, SOLUTION INTRAVENOUS at 13:03

## 2021-01-01 RX ADMIN — MORPHINE SULFATE 4 MG: 4 INJECTION, SOLUTION INTRAMUSCULAR; INTRAVENOUS at 15:49

## 2021-01-01 RX ADMIN — WATER 2 G: 1 INJECTION INTRAMUSCULAR; INTRAVENOUS; SUBCUTANEOUS at 12:15

## 2021-01-01 RX ADMIN — ALUMINUM HYDROXIDE AND MAGNESIUM HYDROXIDE 30 ML: 200; 200 SUSPENSION ORAL at 10:19

## 2021-01-01 RX ADMIN — ONDANSETRON 4 MG: 2 INJECTION INTRAMUSCULAR; INTRAVENOUS at 00:54

## 2021-01-01 RX ADMIN — SODIUM CHLORIDE 25 ML/HR: 9 INJECTION, SOLUTION INTRAVENOUS at 14:31

## 2021-01-01 RX ADMIN — MORPHINE SULFATE 4 MG: 4 INJECTION, SOLUTION INTRAMUSCULAR; INTRAVENOUS at 09:05

## 2021-01-01 RX ADMIN — Medication 10 ML: at 22:00

## 2021-01-01 RX ADMIN — DEXTROSE AND SODIUM CHLORIDE 50 ML/HR: 5; 900 INJECTION, SOLUTION INTRAVENOUS at 17:13

## 2021-01-01 RX ADMIN — MIDAZOLAM 1 MG: 1 INJECTION INTRAMUSCULAR; INTRAVENOUS at 12:15

## 2021-01-01 RX ADMIN — POLYETHYLENE GLYCOL 3350 17 G: 17 POWDER, FOR SOLUTION ORAL at 08:24

## 2021-01-01 RX ADMIN — POTASSIUM BICARBONATE 20 MEQ: 782 TABLET, EFFERVESCENT ORAL at 10:28

## 2021-01-01 RX ADMIN — MORPHINE SULFATE 4 MG: 4 INJECTION, SOLUTION INTRAMUSCULAR; INTRAVENOUS at 00:54

## 2021-01-01 RX ADMIN — HEPARIN 500 UNITS: 100 SYRINGE at 13:08

## 2021-10-12 NOTE — ED TRIAGE NOTES
Pt arrived to ER from home with c/o intermittent abd pain x 2 weeks. Pt states the pain \"comes and goes and is in different spots all over my belly\". Pt denies any NVD    Pt denies any CP    Pt denies any SOB    Pt denies any cough or fevers    Pt denies any medical hx, does not take any routine meds, and has no allergies. Pt does smoke, approx 1/4 pack a day and has been smoking \"forever\". Pt denies any ETOH or illicit drug use.      Pt has PCP appt on Nov 2

## 2021-10-12 NOTE — ED PROVIDER NOTES
HPI   Patient is a 43-year-old female who presents with 2-week history of abdominal pain. She states it feels like a \"discomfort \"in her abdomen. The pain has been moving around and has not been in 1 specific location. She has felt it throughout her abdomen. It does not radiate to her back or chest. She is not sure what makes it better or worse. She has been taking Motrin and she takes this it does make the pain completely go away. She denies any nausea, vomiting, dysuria, hematuria or increased urinary frequency. She denies any vaginal discharge or vaginal bleeding. She denies any change in her bowel habits including constipation, diarrhea or bleeding. She states that she has had a poor appetite over this time but has still been able to eat and drink without issue. Eating does not affect the pain. She is never had surgery on her abdomen exception of C-sections. She denies any fever, sweats or chills. She denies any runny nose, sore throat or cough. No one around her has had similar symptoms. She has never had anything like this before. History reviewed. No pertinent past medical history. Past Surgical History:   Procedure Laterality Date    HX OOPHORECTOMY      1 removed         History reviewed. No pertinent family history.     Social History     Socioeconomic History    Marital status:      Spouse name: Not on file    Number of children: Not on file    Years of education: Not on file    Highest education level: Not on file   Occupational History    Not on file   Tobacco Use    Smoking status: Current Every Day Smoker     Packs/day: 0.25     Years: 50.00     Pack years: 12.50    Smokeless tobacco: Never Used   Substance and Sexual Activity    Alcohol use: Not Currently    Drug use: Never    Sexual activity: Not on file   Other Topics Concern    Not on file   Social History Narrative    Not on file     Social Determinants of Health     Financial Resource Strain:     Difficulty of Paying Living Expenses:    Food Insecurity:     Worried About [facility] in the Last Year:     [address] in the Last Year:    Transportation Needs:     Lack of Transportation (Medical):  Lack of Transportation (Non-Medical):    Physical Activity:     Days of Exercise per Week:     Minutes of Exercise per Session:    Stress:     Feeling of Stress :    Social Connections:     Frequency of Communication with Friends and Family:     Frequency of Social Gatherings with Friends and Family:     Attends Protestant Services:     Active Member of Clubs or Organizations:     Attends Club or Organization Meetings:     Marital Status:    Intimate Partner Violence:     Fear of Current or Ex-Partner:     Emotionally Abused:     Physically Abused:     Sexually Abused: ALLERGIES: Patient has no known allergies. Review of Systems  Constitutional: No fever  HENT: No ear pain  Eyes: No change in vision  Respiratory: No SOB  Cardio: No chest pain  GI: No blood in stool  : No hematuria  MSK: No back pain  Skin: No rashes  Neuro: No headache      Vitals:    10/12/21 0944   BP: (!) 163/86   Pulse: 92   Resp: 18   Temp: 98.3 °F (36.8 °C)   SpO2: 100%   Weight: 56.7 kg (125 lb)   Height: 5' 0.65\" (1.541 m)            Physical Exam   General: No acute distress  Head: Normocephalic, atraumatic  Psych: Cooperative and alert  Eyes: No scleral icterus, normal conjunctiva  ENT: Moist oral mucosa  Neck: Supple  CV: Regular rate and rhythm, no pitting edema, palpable radial pulses  Pulm: Clear breath sounds bilaterally without any wheezing or rhonchi, normal respiratory rate  GI: Normal bowel sounds, soft, non-tender, no CVA tenderness, no palpable aorta  MSK: Moves all four extremities  Skin: No rashes  Neuro: Alert and conversive    [facility]   Patient is a 67-year-old female who presents with a 2-week history of abdominal pain. Patient symptoms are inconsistent with anything significantly emergent.  She has a benign abdominal exam and clinically appears well. She is not having any significant discomfort at this time. Her symptoms completely resolved with Motrin. I do not suspect that she has a AAA, obstruction, significant bowel inflammation or significant acute intra-abdominal process. We discussed the different options moving forward with the patient. I did offer blood work however discussed that this will likely not yield any significant results. We will obtain abdominal x-ray to make sure that there is no obvious abnormalities with constipation however again my suspicion for this is low. She has a benign abdominal exam and no other concerning findings or symptoms going on for 2 weeks do not think a CT scan is necessarily indicated at this time however we did discuss with her the importance of following up with a specialist and her primary care physician especially if her symptoms continue. She denies GI cocktail and Tylenol for her symptoms she has states this makes any difference. Abdominal x-ray does not show any acute intra-abdominal process. Does feel a large stool burden and therefore patient was given a prescription for MiraLAX. Upon reexamination patient states she is feeling much better. Because this we will send her home with a prescription for famotidine. Encourage patient to follow-up with GI and her primary care physician. Patient stable for discharge at this time. Patient is in agreement with the plan to be discharged at this time. All the patient's questions were answered. Patient was given written instructions on the diagnosis, and states understanding of the plan moving forward. We did discuss important signs and symptoms that should prompt quick return to the emergency department. Disposition: Patient was discharged home in stable condition.   They will follow up with GI and her PCP    Prescriptions: Famotidine, Maalox    Diagnosis: Subacute abdominal pain  Procedures    E

## 2021-10-20 NOTE — ED NOTES
Warm blanket and pillow given to pt.  Waiting to hear from [facility] about admission

## 2021-10-20 NOTE — ED NOTES
Pt called and asked for phong and latasha crackers.  Was told to wait for CT results because she is here for abd pain

## 2021-10-20 NOTE — ED TRIAGE NOTES
Was here last Wednesday. Continues to have pain in abdomen. Now has yellowing of the eyes. Starting to lose weight. Pain is upper abdomen - constant pain.

## 2021-10-20 NOTE — ED PROVIDER NOTES
EMERGENCY DEPARTMENT HISTORY AND PHYSICAL EXAM    10:22 AM  Date: 10/20/2021  Patient Name: Debbie Kenny    History of Presenting Illness       History Provided By:     HPI: Debbie Kenny is a 73 y.o. female with past medical history of 2 C-sections and oophorectomy  presents with upper abdominal pain for 3 weeks. Pain is constant, moderate in severity, no associated symptoms or modifying factors. No nausea, vomiting, diarrhea. Patient noted decreased appetite. No dysuria or hematuria. Patient was seen in the ED on October 12th, had an abdominal x-ray and diagnosed with constipation. Now states that her eyes are yellow and she has lost more than 10 pounds. PCP: None    Past History     Past Medical History:  History reviewed. No pertinent past medical history. Past Surgical History:  Past Surgical History:   Procedure Laterality Date    HX OOPHORECTOMY      1 removed       Family History:  History reviewed. No pertinent family history. Social History:  Social History     Tobacco Use    Smoking status: Current Every Day Smoker     Packs/day: 0.25     Years: 50.00     Pack years: 12.50    Smokeless tobacco: Never Used   Substance Use Topics    Alcohol use: Not Currently    Drug use: Never       Allergies:  No Known Allergies    Review of Systems   Review of Systems   Constitutional: Negative for activity change, appetite change and chills. HENT: Negative for congestion, ear discharge, ear pain and sore throat. Eyes: Negative for photophobia and pain. Respiratory: Negative for cough and choking. Cardiovascular: Negative for palpitations and leg swelling. Gastrointestinal: Negative for anal bleeding and rectal pain. Endocrine: Negative for polydipsia and polyuria. Genitourinary: Negative for genital sores and urgency. Musculoskeletal: Negative for arthralgias and myalgias. Neurological: Negative for dizziness, seizures and speech difficulty.    Psychiatric/Behavioral: Negative for hallucinations, self-injury and suicidal ideas. Physical Exam     Patient Vitals for the past 12 hrs:   Temp Pulse Resp BP SpO2   10/20/21 1011 99.1 °F (37.3 °C) (!) 103 16 114/76 100 %       Physical Exam  Vitals and nursing note reviewed. Constitutional:       Appearance: She is well-developed. HENT:      Head: Normocephalic and atraumatic. Eyes:      General: Scleral icterus present. Right eye: No discharge. Left eye: No discharge. Cardiovascular:      Rate and Rhythm: Normal rate and regular rhythm. Heart sounds: Normal heart sounds. No murmur heard. Pulmonary:      Effort: Pulmonary effort is normal. No respiratory distress. Breath sounds: Normal breath sounds. No stridor. No wheezing or rales. Chest:      Chest wall: No tenderness. Abdominal:      General: Bowel sounds are normal. There is no distension. Palpations: Abdomen is soft. Tenderness: There is abdominal tenderness in the epigastric area. There is guarding. There is no rebound. Musculoskeletal:         General: Normal range of motion. Cervical back: Normal range of motion and neck supple. Skin:     General: Skin is warm and dry. Neurological:      Mental Status: She is alert and oriented to person, place, and time.          Diagnostic Study Results     Labs -  Recent Results (from the past 12 hour(s))   CREATININE, POC    Collection Time: 10/20/21 12:07 PM   Result Value Ref Range    Creatinine, POC 0.6 0.6 - 1.3 MG/DL    GFRAA, POC >60 >60 ml/min/1.73m2    GFRNA, POC >60 >60 ml/min/1.73m2   CBC WITH AUTOMATED DIFF    Collection Time: 10/20/21 12:59 PM   Result Value Ref Range    WBC 11.6 4.6 - 13.2 K/uL    RBC 4.39 4.20 - 5.30 M/uL    HGB 12.6 12.0 - 16.0 g/dL    HCT 37.3 35.0 - 45.0 %    MCV 85.0 78.0 - 100.0 FL    MCH 28.7 24.0 - 34.0 PG    MCHC 33.8 31.0 - 37.0 g/dL    RDW 14.9 (H) 11.6 - 14.5 %    PLATELET 041 865 - 651 K/uL    MPV 10.1 9.2 - 11.8 FL    NEUTROPHILS 80 (H) 40 - 73 %    LYMPHOCYTES 12 (L) 21 - 52 %    MONOCYTES 7 3 - 10 %    EOSINOPHILS 1 0 - 5 %    BASOPHILS 0 0 - 2 %    ABS. NEUTROPHILS 9.3 (H) 1.8 - 8.0 K/UL    ABS. LYMPHOCYTES 1.4 0.9 - 3.6 K/UL    ABS. MONOCYTES 0.8 0.05 - 1.2 K/UL    ABS. EOSINOPHILS 0.1 0.0 - 0.4 K/UL    ABS. BASOPHILS 0.0 0.0 - 0.1 K/UL    DF AUTOMATED     METABOLIC PANEL, BASIC    Collection Time: 10/20/21 12:59 PM   Result Value Ref Range    Sodium 136 136 - 145 mmol/L    Potassium 4.1 3.5 - 5.5 mmol/L    Chloride 101 100 - 111 mmol/L    CO2 29 21 - 32 mmol/L    Anion gap 6 3.0 - 18 mmol/L    Glucose 86 74 - 99 mg/dL    BUN 9 7.0 - 18 MG/DL    Creatinine 0.47 (L) 0.6 - 1.3 MG/DL    BUN/Creatinine ratio 19 12 - 20      GFR est AA >60 >60 ml/min/1.73m2    GFR est non-AA >60 >60 ml/min/1.73m2    Calcium 8.7 8.5 - 10.1 MG/DL   HEPATIC FUNCTION PANEL    Collection Time: 10/20/21 12:59 PM   Result Value Ref Range    Protein, total 6.8 6.4 - 8.2 g/dL    Albumin 2.6 (L) 3.4 - 5.0 g/dL    Globulin 4.2 (H) 2.0 - 4.0 g/dL    A-G Ratio 0.6 (L) 0.8 - 1.7      Bilirubin, total 7.3 (H) 0.2 - 1.0 MG/DL    Bilirubin, direct 5.6 (H) 0.0 - 0.2 MG/DL    Alk. phosphatase 458 (H) 45 - 117 U/L    AST (SGOT) 283 (H) 10 - 38 U/L    ALT (SGPT) 579 (H) 13 - 56 U/L   LIPASE    Collection Time: 10/20/21 12:59 PM   Result Value Ref Range    Lipase 5,060 (H) 73 - 393 U/L       Radiologic Studies -   CT ABD PELV W CONT    Result Date: 10/20/2021  1. Large ill-defined hypoenhancing mass in the uncinate process with mass effect and displacement of SMA/SMV, most concerned for primary malignancy. 2. Numerous ill-defined hypodense masses in the liver, most compatible with metastasis. 3. Marked intrahepatic and extrahepatic biliary dilatation, likely mass effect by tumor compression in the region of ampulla. 4. No lymphadenopathy. 5. Small left renal cyst. Small pelvic free fluid.  Thank you for this referral.         Medical Decision Making     ED Course: Progress Notes, Reevaluation, and Consults:    10:22 AM Initial assessment performed. The patients presenting problems have been discussed, and they/their family are in agreement with the care plan formulated and outlined with them. I have encouraged them to ask questions as they arise throughout their visit. Provider Notes (Medical Decision Making):   Patient presents with scleral icterus, epigastric pain and tenderness  Labs as interpreted by me:  Bilirubin 5.6, lipase 5060  Alkaline phosphatase 458  CT abdomen pelvis with pancreatic mass of the uncinate process with some mass-effect in the region of ampulla  Discussed with Dr. Staci Hardin surgeon  Dr. Staci Hardin kindly discussed with Dr. Marrian Moritz surgical oncologist  Recommended transfer to [facility] and admission under hospitalist for obstructive jaundice  Once there surgical oncology evaluated patient and decide on surgical intervention  Patient admitted under Dr Enoch Bowden hospitalist in [facility]  Diagnosis discussed with patient agrees with transfer  Patient currently hemodynamically stable  Patient remains in [facility] ED until bed available in [facility]  Patient discussed with Dr. Guerrero Peoples night shift ED attending    Critical Care:  CRITICAL CARE:  07:00 PM  I have spent 50 minutes of critical care time involved in lab review, consultations with specialist, family decision-making, and documentation. This time does not include separately billable procedures. During this entire length of time I was immediately available to the patient. Critical Care: The reason for providing this level of medical care for this critically ill patient was due a critical illness that impaired one or more vital organ systems such that there was a high probability of imminent or life threatening deterioration in the patients condition.  This care involved high complexity decision making to assess, manipulate, and support vital system functions, to treat this degreee vital organ system failure and to prevent further life threatening deterioration of the patients condition. Vital Signs-Reviewed the patient's vital signs. Reviewed pt's pulse ox reading. Records Reviewed: old medical records  -I am the first provider for this patient.  -I reviewed the vital signs, available nursing notes, past medical history, past surgical history, family history and social history. Current Outpatient Medications   Medication Sig Dispense Refill    famotidine (PEPCID) 20 mg tablet Take 1 Tablet by mouth daily. 30 Tablet 0    polyethylene glycol (Miralax) 17 gram/dose powder Take 17 g by mouth daily. 1 tablespoon with 8 oz of water daily 289 g 0        Clinical Impression     Clinical Impression: No diagnosis found. Disposition: This note was dictated utilizing voice recognition software which may lead to typographical errors. I apologize in advance if the situation occurs. If questions arise please do not hesitate to contact me or call our department.     Marisa Villegas MD  10:22 AM

## 2021-10-20 NOTE — ED NOTES
Pt resting on stretcher.  Continues to wait for room assignment at [facility]

## 2021-10-21 NOTE — ED NOTES
Pt moved to hospital bed for her comfort.  Access center called to find out about bed status and if pt is scheduled for OR today

## 2021-10-21 NOTE — ED NOTES
Spoke with access center. Was advised that there are still no beds at [facility] and that pt is able to eat at this time.  Pt c/o increased abd pain and is requesting

## 2021-10-21 NOTE — ED NOTES
Assumed care of pt. Report received from [facility]. Pt resting on stretcher. Waiting for room assignment at [facility].

## 2021-10-21 NOTE — ED NOTES
Spoke with [facility] who spoke with [facility].  States they had no beds at this time

## 2021-10-22 PROBLEM — K83.1 OBSTRUCTIVE JAUNDICE: Status: ACTIVE | Noted: 2021-01-01

## 2021-10-22 NOTE — ED NOTES
Pt transported via [address] [place] [address] to [address] to rm 1115. Pt verbalized understanding for plan of care. Denies needs. Given morphine 4mg prior to transport due to increasing pain.

## 2021-10-22 NOTE — ED NOTES
Pt resting comfortably on stretcher, lunch provided by family. Pt discussed potential placement at [facility] or [facility] with provider. Awaiting call from access center. Pt denies pain at this time. Continues on maintenance fluids.

## 2021-10-22 NOTE — ED NOTES
I did not receive handoff on this patient, however reviewed the chart. Patient is here with pancreatic mass, obstructive jaundice. Awaiting transfer to [facility] she needs a facility presumably with ERCP capability, as well as surgical oncology. 8:44 AM  Informed that no bed will be immediately available. I will order DVT prophylaxis for the patient and given her high risk status of likely pancreatic cancer. Will order repeat labs for today. Considered alternative facility however looks like our general surgeon, Dr. Arnaldo Fowler spoke with the specific surgeon at [facility] and it looks like that it would be more prudent for her to go over there to continue the evaluations already been started. ED Course as of Oct 22 2128   Fri Oct 22, 2021   1022 Reportedly [facility] has some availability at the [place] [facility], the family is indicated that is too far for them. They declined to the possibility of going to [facility]. We will check with [facility] (ERCP), [facility] is full with a long wait list, will also check [facility]. Advised the transfer center of the needs would likely be ERCP with possible stent placement, and evaluation by surgical oncology. [GABBY]   0460 Discussed with GI at [facility] hospital.  He agreed that patient could be cared for at [facility], Dr. Monet Guy. Unfortunately no [facility] beds available. The ED declined to accept ED to ED transfer. [GABBY]   1999 Discussed with transfer center again, the [facility] surgeon who was thought to be able to care for the patient is on vacation until Thursday. [GABBY]   5220 Advised transfer center to continue the search, contact [facility] later to discuss if any beds became available and excepting the patient, will continue to look at other facilities including [facility] and [address] [place] [address]. [facility], [facility] or other alternative options.     [GABBY]   8930 Sepsis and transfer [facility] hospitalist, Dr. Parker Villavicencio    [GABBY]      ED Course User Index  [GABBY] Marlon Cooper, DO     Patient accepted at [facility] by Dr. Camilo Wade.     Dejah Harris DO

## 2021-10-22 NOTE — H&P
Hospitalist Admission Note    NAME: Maria Nyhan   :  1955   MRN:  [id number]     Date/Time:  10/22/2021 6:21 PM    Patient PCP: None  ______________________________________________________________________  Given the patient's current clinical presentation, I have a high level of concern for decompensation if discharged from the emergency department. Complex decision making was performed, which includes reviewing the patient's available past medical records, laboratory results, and x-ray films. My assessment of this patient's clinical condition and my plan of care is as follows. Assessment / Plan:  Abdominal pain  Obstructive jaundice  Pancreatic mass with multiple liver lesions concerning for mets  Elevated lipase  Start IV fluids  MRCP for further evaluation  GI consult  N.p.o. till midnight in case she is getting procedure (ERCP + stent)  Clear liquid diet for now  Follow-up CA 19-9 level  Pain management  Bowel regimen    Code Status: full   Surrogate Decision Maker:   DVT Prophylaxis: SCDs  GI Prophylaxis: not indicated  Baseline: independent       Subjective:   CHIEF COMPLAINT: abd pain    HISTORY OF PRESENT ILLNESS:     Oz Rodriguez is a 73 y.o.  female who presents with the above CC. Pt presented with abd pain started 3 weeks ago, no reported vomiting, fever and chills. Pt reported that she lost about 8 lbs. Denies etoh. Pt presented to ED on 10/20 for abd pain, KUB showed constipation and pt sent home. Today pt came back as she noticed her eyes becoming yellowish. No results found. We were asked to admit for work up and evaluation of the above problems. No past medical history on file.      Past Surgical History:   Procedure Laterality Date    HX OOPHORECTOMY      1 removed       Social History     Tobacco Use    Smoking status: Current Every Day Smoker     Packs/day: 0.25     Years: 50.00     Pack years: 12.50    Smokeless tobacco: Never Used   Substance Use Topics    Alcohol use: Not Currently        No family history on file. Father with Colon cancer and prostate cancer . No FHx of pancreatic cancer  No Known Allergies     Prior to Admission medications    Medication Sig Start Date End Date Taking? Authorizing Provider   famotidine (PEPCID) 20 mg tablet Take 1 Tablet by mouth daily. 10/12/21   Hiram Lanier MD   polyethylene glycol (Miralax) 17 gram/dose powder Take 17 g by mouth daily. 1 tablespoon with 8 oz of water daily 10/12/21   Cameron Woodward MD       REVIEW OF SYSTEMS:     Total of 12 systems reviewed, negative except for the above. Objective:   VITALS:    Visit Vitals  BP (!) 156/82 (BP 1 Location: Left upper arm, BP Patient Position: At rest)   Pulse 83   Temp 98.6 °F (37 °C)   Resp 16   SpO2 99%     No intake or output data in the 24 hours ending 10/22/21 1821   Wt Readings from Last 10 Encounters:   10/20/21 50.3 kg (111 lb)   10/12/21 56.7 kg (125 lb)       PHYSICAL EXAM:    General:    Alert, cooperative, no distress, appears stated age. HEENT: Atraumatic, anicteric sclerae, pink conjunctivae, MMM  Neck:  Supple, symmetrical  Lungs:   CTA. No Wheezing/Rhonchi. No rales. No tenderness  No Accessory muscle use. Heart:   Regular rhythm. No murmur. No JVD   Abdomen:   Soft. ND.  BS normal. abd tenderness  Extremities: No edema. No cyanosis. No clubbing. Skin:     Not pale. Not Jaundiced. No rashes   Psych:  Good insight. Not depressed. Not anxious or agitated. Neurologic: Alert and oriented X 4. EOMs intact. No facial asymmetry. No slurred speech.  Symmetrical strength, Sensation grossly intact.     _______________________________________________________________________  Care Plan discussed with:    Comments   Patient x    Family      RN     Care Manager                    Consultant:      _______________________________________________________________________  Expected  Disposition:   Home with Family x HH/PT/OT/RN    SNF/LTC    KALYAN    ________________________________________________________________________  TOTAL TIME:  39 Minutes    Critical Care Provided     Minutes non procedure based      Comments    x Reviewed previous records   >50% of visit spent in counseling and coordination of care x Discussion with patient and/or family and questions answered       ________________________________________________________________________  Signed: Chitra James MD    Procedures: see electronic medical records for all procedures/Xrays and details which were not copied into this note but were reviewed prior to creation of Plan. LAB DATA REVIEWED:    Recent Results (from the past 24 hour(s))   CBC WITH AUTOMATED DIFF    Collection Time: 10/22/21  8:59 AM   Result Value Ref Range    WBC 10.7 4.6 - 13.2 K/uL    RBC 4.00 (L) 4.20 - 5.30 M/uL    HGB 11.4 (L) 12.0 - 16.0 g/dL    HCT 33.4 (L) 35.0 - 45.0 %    MCV 83.5 78.0 - 100.0 FL    MCH 28.5 24.0 - 34.0 PG    MCHC 34.1 31.0 - 37.0 g/dL    RDW 14.7 (H) 11.6 - 14.5 %    PLATELET 522 783 - 555 K/uL    MPV 9.6 9.2 - 11.8 FL    NEUTROPHILS 71 40 - 73 %    LYMPHOCYTES 15 (L) 21 - 52 %    MONOCYTES 9 3 - 10 %    EOSINOPHILS 3 0 - 5 %    BASOPHILS 0 0 - 2 %    ABS. NEUTROPHILS 7.6 1.8 - 8.0 K/UL    ABS. LYMPHOCYTES 1.7 0.9 - 3.6 K/UL    ABS. MONOCYTES 1.0 0.05 - 1.2 K/UL    ABS. EOSINOPHILS 0.3 0.0 - 0.4 K/UL    ABS.  BASOPHILS 0.0 0.0 - 0.1 K/UL    DF AUTOMATED     METABOLIC PANEL, COMPREHENSIVE    Collection Time: 10/22/21  8:59 AM   Result Value Ref Range    Sodium 139 136 - 145 mmol/L    Potassium 3.4 (L) 3.5 - 5.5 mmol/L    Chloride 107 100 - 111 mmol/L    CO2 27 21 - 32 mmol/L    Anion gap 5 3.0 - 18 mmol/L    Glucose 95 74 - 99 mg/dL    BUN 4 (L) 7.0 - 18 MG/DL    Creatinine 0.38 (L) 0.6 - 1.3 MG/DL    BUN/Creatinine ratio 11 (L) 12 - 20      GFR est AA >60 >60 ml/min/1.73m2    GFR est non-AA >60 >60 ml/min/1.73m2    Calcium 7.7 (L) 8.5 - 10.1 MG/DL    Bilirubin, total 8.0 (H) 0.2 - 1.0 MG/DL    ALT (SGPT) 406 (H) 13 - 56 U/L    AST (SGOT) 212 (H) 10 - 38 U/L    Alk. phosphatase 445 (H) 45 - 117 U/L    Protein, total 6.0 (L) 6.4 - 8.2 g/dL    Albumin 2.1 (L) 3.4 - 5.0 g/dL    Globulin 3.9 2.0 - 4.0 g/dL    A-G Ratio 0.5 (L) 0.8 - 1.7     LIPASE    Collection Time: 10/22/21  8:59 AM   Result Value Ref Range    Lipase 3,307 (H) 73 - 393 U/L   COVID-19 RAPID TEST    Collection Time: 10/22/21 12:03 PM   Result Value Ref Range    Specimen source Nasopharyngeal      COVID-19 rapid test Not detected NOTD       No results found. Current Medications:   No current facility-administered medications for this encounter.

## 2021-10-22 NOTE — ED NOTES
Pt awake and oriented x4, calm and cooperative with care plan. Pt given Heparin SQ, zofran and morphine per order. Pt verbalized understanding for continued wait for bed placement at [facility]. Pt bedside commode emptied. Pt  at bedside, pt plans to complete bed bath with  assistance, denies any further assistance from this nurse at this time.

## 2021-10-22 NOTE — ROUTINE PROCESS
TRANSFER - OUT REPORT:    Verbal report given to Jackelin Ballesteros RN(name) on Capri Dewitt  being transferred to ED [facility]5(unit) for routine progression of care       Report consisted of patients Situation, Background, Assessment and   Recommendations(SBAR). Information from the following report(s) SBAR, ED Summary and MAR was reviewed with the receiving nurse. Lines:   Peripheral IV 10/21/21 Right Forearm (Active)   Site Assessment Clean, dry, & intact 10/21/21 1230   Dressing Status Clean, dry, & intact 10/21/21 1230   Dressing Type Transparent 10/21/21 1230   Hub Color/Line Status Flushed 10/21/21 1230        Opportunity for questions and clarification was provided.       Patient transported with:   Monitor   IVx1

## 2021-10-22 NOTE — ED NOTES
Received report from Raymond, [facility]. Pt resting comfortably at this time. Adequate chest rise and fall noted. Pt awaiting bed placement at [facility].

## 2021-10-22 NOTE — ED NOTES
Purposeful rounding completed:  Side rails up x 2:  YES  Bed in low position and wheels locked: YES  Call bell within reach: YES  Comfort addressed: YES    Toileting needs addressed: YES BSC placed in room for ease and comfort  Plan of care reviewed/updated with patient and or family members: YES  IV site assessed: YES  Pain assessed and addressed: YES, sleeping, not aroused. Will continue to monitor.

## 2021-10-23 NOTE — PROGRESS NOTES
Bedside shift change report given to BERTHA LEON (oncoming nurse) by Arcadio Oliva (offgoing nurse). Report included the following information SBAR, Kardex and MAR.

## 2021-10-23 NOTE — PROGRESS NOTES
Transition of Care Plan:    RUR: 15  Disposition:Plan is home with spouse once stable. GENIE: 10/26? Follow up appointments:Pt is going to new  PCP in [place] but can not remember name. Office is [facility]? Scheduled already for 11/2/21? GI: [facility]: Scheduled for 11/4/21? DME needed:n/a  Transportation at Discharge: Spouse able to transport her home  [place] or means to access home:   Spouse     IM Medicare Letter: to be delivered prior to DC  Is patient a BCPI-A Bundle:        n/a   If yes, was Bundle Letter given?:     Caregiver Contact:Spouse; Vigrinia Lewis; [phone]  Son: Melissa Smaller: [phone]  DTR: Ann-Marie Lia: [phone]  Discharge Caregiver contacted prior to discharge? Reviewed plan with Pt, Spouse and Son in room    Reason for Admission:                       RUR Score:          12           Plan for utilizing home health:    N/a       PCP: First and Last name:  None New PCP in [place]. . can not remember name  Appt scheduled 11/2/21     [facility] of [facility]: [facility]. Are you a current patient: Yes/No:    Approximate date of last visit:    Can you participate in a virtual visit with your PCP:                     Current Advanced Directive/Advance Care Plan: Full Code      Healthcare Decision Maker:   Spouse; Virginia Lewis; [phone]  Son: Melissa Smaller: [phone]  DTR: Ann-Marie Barajasa: [phone]                    Eval:   Pt is alert and oriented. Lives with spouse in second floor appt with 12 ERICA  No DME  I W ADLs   Does not drive but spouse is retired and drives. No HH or SNF experience. Pharmacy: [facility] on [address] [place] [address] in [place]. Rx coverage. Family can transport Pt home in car. CM will continue to monitor discharge plan. Care Management Interventions  PCP Verified by CM: Yes  Palliative Care Criteria Met (RRAT>21 & CHF Dx)?: No  Mode of Transport at Discharge:  Other (see comment)  Transition of Care Consult (CM Consult): Discharge Planning  MyChart Signup: No  Discharge Durable Medical Equipment: No  Physical Therapy Consult: No  Occupational Therapy Consult: No  Speech Therapy Consult: No  Support Systems: Spouse/Significant Other, Child(barbara)  Confirm Follow Up Transport: Family  Discharge Location  Discharge Placement: Home with family assistance      Mee Gan, [address] [place] [address]

## 2021-10-23 NOTE — PROGRESS NOTES
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

## 2021-10-23 NOTE — ROUTINE PROCESS
Bedside/Verbal shift change report given to [address] (oncoming nurse) by Rosa Ortega (offgoing nurse). Report included the following information SBAR, ED Summary, Procedure Summary, Intake/Output, MAR and Recent Results. NPO since midnight for possible procedure and Gastro consult should be called this morning.

## 2021-10-23 NOTE — PROGRESS NOTES
End of Shift Note    Bedside shift change report given to  (oncoming nurse) by Maria Warner RN (offgoing nurse). Report included the following information SBAR    Shift worked:  7a-7p     Shift summary and any significant changes:    IVF infusing, assisted in ADLs PRN, pt up ad el, managed pain, pt resting comfortably in room with  at the bedside   Concerns for physician to address:    Zone phone for oncoming shift:  [phone]     Activity:  Activity Level: Up ad el  Number times ambulated in hallways past shift: 0  Number of times OOB to chair past shift: 1    Cardiac:   Cardiac Monitoring: Yes      Cardiac Rhythm: Sinus Rhythm    Access:   Current line(s): PIV     Genitourinary:   Urinary status: voiding    Respiratory:   O2 Device: None (Room air)  Chronic home O2 use?: NO  Incentive spirometer at bedside: NO     GI:     Current diet:  ADULT DIET Regular  DIET NPO  ADULT ORAL NUTRITION SUPPLEMENT Breakfast, Lunch, Dinner; Standard High Calorie/High Protein  Passing flatus: YES  Tolerating current diet: YES       Pain Management:   Patient states pain is manageable on current regimen: YES    Skin:  Simon Score: 22  Interventions: increase time out of bed    Patient Safety:  Fall Score:  Total Score: 1  Interventions: bed/chair alarm, gripper socks and pt to call before getting OOB       Length of Stay:  Expected LOS: - - -  Actual LOS: [address], RN

## 2021-10-23 NOTE — CONSULTS
Patient seen, chart reviewed, note dictated. 72 y/o previously healthy woman, admitted in transfer from a hospital in [place] for work-up of abdominal pain with pancreatic lesions and liver lesions concerning, but not diagnostic for metastatic pancreatic adenocarcinoma. Elevated lipase, but normal CA 19-9. FH of colon CA in her father in his 62s. 1. 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 Case d/w patient,  and son at bedside. Final recommendations depend on final pathology. Will follow with you. 2. Prophylaxis: given increase risk of thrombosis with pancreaticobiliary malignancies, recommend lovenox today and tomorrow, will hold on Monday. Thank you for consult.      Juan José Dykes MD  Hem/Onc  [phone]

## 2021-10-23 NOTE — PROGRESS NOTES
Problem: Falls - Risk of  Goal: *Absence of Falls  Description: Document Gilarebelscott Boatengon Fall Risk and appropriate interventions in the flowsheet.   Outcome: Progressing Towards Goal  Note: Fall Risk Interventions:            Medication Interventions: Teach patient to arise slowly

## 2021-10-23 NOTE — CONSULTS
GI Consultation Note Ilsa Arvizu)    NAME: Sandra Zamora : 1955 MRN: [id number]   ATTG: Danilo Brannon MD/Cory Bowens NP PCP: None  Date/Time:  10/23/2021 7:53 AM  Subjective:   REASON FOR CONSULT:      Alvin Garcia is a 73 y.o.  female who I was asked to see for evaluation of jaundice with noted pancreatic mass with mets. She was transferred from [facility] in [place], [place] yesterday evening where she has been since 10/20/21 with CT on on 10/20/21 demonstrating a pancreatic mass. She noted abdominal discomfort beginning 2-3 wks ago prompting ER eval 2wks ago with discomfort then attributed to constipation based on exam and X-ray. She returned to ER on 10/20/21 c/o progressive jaundice with dark urine, acholic stool, and icterus over a few days. She denies any discomfort with PO or change in bowel habit otherwise. She noted she is hungry . She has never sought prior medical care until her ER visit a few weeks ago. The Labs and CT obtained at [facility] ER prompt seeking x-valentina for further evaluation. Testing there included WBC 11.6, INR 1.2, lipase 5060 (now 1861), T.bili 7.3 (now 8.6), AST//579 (now 186/352. No oncologic eval or GI eval was completed while waiting as ER patient over the last 48hrs. CA 19-9 here is 12. CT 10/20/21 and MRI 10/22/21 are as below. No past medical history on file. Past Surgical History:   Procedure Laterality Date    HX OOPHORECTOMY      1 removed     Social History     Tobacco Use    Smoking status: Current Every Day Smoker     Packs/day: 0.25     Years: 50.00     Pack years: 12.50    Smokeless tobacco: Never Used   Substance Use Topics    Alcohol use: Not Currently      No family history on file. No Known Allergies   Home Medications:  Prior to Admission Medications   Prescriptions Last Dose Informant Patient Reported? Taking?   famotidine (PEPCID) 20 mg tablet   No No   Sig: Take 1 Tablet by mouth daily.    polyethylene glycol (Miralax) 17 gram/dose powder   No No   Sig: Take 17 g by mouth daily.  1 tablespoon with 8 oz of water daily      Facility-Administered Medications: None     Hospital medications:  Current Facility-Administered Medications   Medication Dose Route Frequency    famotidine (PEPCID) tablet 20 mg  20 mg Oral DAILY    polyethylene glycol (MIRALAX) packet 17 g  17 g Oral DAILY    sodium chloride (NS) flush 5-40 mL  5-40 mL IntraVENous Q8H    sodium chloride (NS) flush 5-40 mL  5-40 mL IntraVENous PRN    acetaminophen (TYLENOL) tablet 650 mg  650 mg Oral Q6H PRN    Or    acetaminophen (TYLENOL) suppository 650 mg  650 mg Rectal Q6H PRN    ondansetron (ZOFRAN ODT) tablet 4 mg  4 mg Oral Q8H PRN    Or    ondansetron (ZOFRAN) injection 4 mg  4 mg IntraVENous Q6H PRN    senna (SENOKOT) tablet 8.6 mg  1 Tablet Oral DAILY    bisacodyL (DULCOLAX) tablet 5 mg  5 mg Oral DAILY PRN    morphine injection 2 mg  2 mg IntraVENous Q4H PRN    oxyCODONE IR (ROXICODONE) tablet 5 mg  5 mg Oral Q4H PRN    dextrose 5% and 0.9% NaCl infusion  50 mL/hr IntraVENous CONTINUOUS     REVIEW OF SYSTEMS:     [x]    Total of 11 systems reviewed as follows:  Const:  negative fever, negative chills, negative weight loss  Eyes:   negative diplopia or visual changes, negative eye pain  ENT:   negative coryza, negative sore throat  Resp:   negative cough, hemoptysis, dyspnea  Cards:  negative for chest pain, palpitations, lower extremity edema  :  negative for frequency, dysuria and hematuria  Skin:   negative for rash and pruritus  Heme:  negative for easy bruising and gum/nose bleeding  MS:  negative for myalgias, arthralgias, back pain and muscle weakness  Neurolo:  negative for headaches, dizziness, vertigo, memory problems   Psych:  negative for feelings of anxiety, depression     Pertinent Positives include :    Objective:   VITALS:    Visit Vitals  BP (!) 155/85   Pulse 72   Temp 98.5 °F (36.9 °C)   Resp 18   SpO2 100%     Temp (24hrs), Av.5 °F (36.9 °C), Min:98 °F (36.7 °C), Max:98.8 °F (37.1 °C)    PHYSICAL EXAM:   General:    Alert, cooperative, no distress, appears stated age. Thin. Head:   Normocephalic, without obvious abnormality, atraumatic. Eyes:   Conjunctivae clear, +icteric sclerae. Pupils are equal  Nose:  Nares normal. No drainage or sinus tenderness. Throat:    Lips, mucosa, and tongue normal.  No Thrush  Neck:  Supple, symmetrical,  no adenopathy, thyroid: non tender  Back:    Symmetric,  No CVA tenderness. Lungs:   CTA bilaterally. No wheezing/rhonchi/rales. Chest wall:  No tenderness or deformity. No Accessory muscle use. Heart:   Regular rate and rhythm,  no murmur, rub or gallop. Abdomen:   Soft, non-tender. Not distended. Bowel sounds normal. No masses  Extremities: Atraumatic, No cyanosis. No edema. No clubbing  Skin:     Texture, turgor normal. No rashes/lesions. +jaundice  Lymph: Cervical, supraclavicular normal.  Psych:  Good insight. Not depressed. Not anxious or agitated. Neurologic: EOMs intact. No facial asymmetry/ aphasia/slurred speech. Normal strength, A/O X 3. LAB DATA REVIEWED:    Recent Results (from the past 48 hour(s))   CBC WITH AUTOMATED DIFF    Collection Time: 10/22/21  8:59 AM   Result Value Ref Range    WBC 10.7 4.6 - 13.2 K/uL    RBC 4.00 (L) 4.20 - 5.30 M/uL    HGB 11.4 (L) 12.0 - 16.0 g/dL    HCT 33.4 (L) 35.0 - 45.0 %    MCV 83.5 78.0 - 100.0 FL    MCH 28.5 24.0 - 34.0 PG    MCHC 34.1 31.0 - 37.0 g/dL    RDW 14.7 (H) 11.6 - 14.5 %    PLATELET 457 440 - 151 K/uL    MPV 9.6 9.2 - 11.8 FL    NEUTROPHILS 71 40 - 73 %    LYMPHOCYTES 15 (L) 21 - 52 %    MONOCYTES 9 3 - 10 %    EOSINOPHILS 3 0 - 5 %    BASOPHILS 0 0 - 2 %    ABS. NEUTROPHILS 7.6 1.8 - 8.0 K/UL    ABS. LYMPHOCYTES 1.7 0.9 - 3.6 K/UL    ABS. MONOCYTES 1.0 0.05 - 1.2 K/UL    ABS. EOSINOPHILS 0.3 0.0 - 0.4 K/UL    ABS.  BASOPHILS 0.0 0.0 - 0.1 K/UL    DF AUTOMATED     METABOLIC PANEL, COMPREHENSIVE    Collection Time: 10/22/21  8:59 AM   Result Value Ref Range    Sodium 139 136 - 145 mmol/L    Potassium 3.4 (L) 3.5 - 5.5 mmol/L    Chloride 107 100 - 111 mmol/L    CO2 27 21 - 32 mmol/L    Anion gap 5 3.0 - 18 mmol/L    Glucose 95 74 - 99 mg/dL    BUN 4 (L) 7.0 - 18 MG/DL    Creatinine 0.38 (L) 0.6 - 1.3 MG/DL    BUN/Creatinine ratio 11 (L) 12 - 20      GFR est AA >60 >60 ml/min/1.73m2    GFR est non-AA >60 >60 ml/min/1.73m2    Calcium 7.7 (L) 8.5 - 10.1 MG/DL    Bilirubin, total 8.0 (H) 0.2 - 1.0 MG/DL    ALT (SGPT) 406 (H) 13 - 56 U/L    AST (SGOT) 212 (H) 10 - 38 U/L    Alk. phosphatase 445 (H) 45 - 117 U/L    Protein, total 6.0 (L) 6.4 - 8.2 g/dL    Albumin 2.1 (L) 3.4 - 5.0 g/dL    Globulin 3.9 2.0 - 4.0 g/dL    A-G Ratio 0.5 (L) 0.8 - 1.7     LIPASE    Collection Time: 10/22/21  8:59 AM   Result Value Ref Range    Lipase 3,307 (H) 73 - 393 U/L   CANCER AG 19-9    Collection Time: 10/22/21  8:59 AM   Result Value Ref Range    Carbohydrate Antigen 19-9, (CA 19-9) 12 0 - 35 U/mL   COVID-19 RAPID TEST    Collection Time: 10/22/21 12:03 PM   Result Value Ref Range    Specimen source Nasopharyngeal      COVID-19 rapid test Not detected NOTD     METABOLIC PANEL, COMPREHENSIVE    Collection Time: 10/23/21  3:42 AM   Result Value Ref Range    Sodium 136 136 - 145 mmol/L    Potassium 3.5 3.5 - 5.1 mmol/L    Chloride 104 97 - 108 mmol/L    CO2 28 21 - 32 mmol/L    Anion gap 4 (L) 5 - 15 mmol/L    Glucose 111 (H) 65 - 100 mg/dL    BUN 2 (L) 6 - 20 MG/DL    Creatinine 0.33 (L) 0.55 - 1.02 MG/DL    BUN/Creatinine ratio 6 (L) 12 - 20      GFR est AA >60 >60 ml/min/1.73m2    GFR est non-AA >60 >60 ml/min/1.73m2    Calcium 9.0 8.5 - 10.1 MG/DL    Bilirubin, total 8.6 (H) 0.2 - 1.0 MG/DL    ALT (SGPT) 352 (H) 12 - 78 U/L    AST (SGOT) 186 (H) 15 - 37 U/L    Alk.  phosphatase 433 (H) 45 - 117 U/L    Protein, total 6.0 (L) 6.4 - 8.2 g/dL    Albumin 1.9 (L) 3.5 - 5.0 g/dL    Globulin 4.1 (H) 2.0 - 4.0 g/dL    A-G Ratio 0.5 (L) 1.1 - 2.2 MAGNESIUM    Collection Time: 10/23/21  3:42 AM   Result Value Ref Range    Magnesium 1.8 1.6 - 2.4 mg/dL   CBC WITH AUTOMATED DIFF    Collection Time: 10/23/21  3:42 AM   Result Value Ref Range    WBC 10.6 3.6 - 11.0 K/uL    RBC 3.71 (L) 3.80 - 5.20 M/uL    HGB 10.7 (L) 11.5 - 16.0 g/dL    HCT 31.6 (L) 35.0 - 47.0 %    MCV 85.2 80.0 - 99.0 FL    MCH 28.8 26.0 - 34.0 PG    MCHC 33.9 30.0 - 36.5 g/dL    RDW 15.0 (H) 11.5 - 14.5 %    PLATELET 752 497 - 413 K/uL    MPV 9.7 8.9 - 12.9 FL    NRBC 0.0 0  WBC    ABSOLUTE NRBC 0.00 0.00 - 0.01 K/uL    NEUTROPHILS 71 32 - 75 %    LYMPHOCYTES 16 12 - 49 %    MONOCYTES 9 5 - 13 %    EOSINOPHILS 3 0 - 7 %    BASOPHILS 1 0 - 1 %    IMMATURE GRANULOCYTES 0 0.0 - 0.5 %    ABS. NEUTROPHILS 7.6 1.8 - 8.0 K/UL    ABS. LYMPHOCYTES 1.6 0.8 - 3.5 K/UL    ABS. MONOCYTES 1.0 0.0 - 1.0 K/UL    ABS. EOSINOPHILS 0.3 0.0 - 0.4 K/UL    ABS. BASOPHILS 0.1 0.0 - 0.1 K/UL    ABS. IMM. GRANS. 0.0 0.00 - 0.04 K/UL    DF AUTOMATED     PROTHROMBIN TIME + INR    Collection Time: 10/23/21  3:42 AM   Result Value Ref Range    INR 1.2 (H) 0.9 - 1.1      Prothrombin time 12.5 (H) 9.0 - 11.1 sec   LIPASE    Collection Time: 10/23/21  3:42 AM   Result Value Ref Range    Lipase 1,861 (H) 73 - 393 U/L     IMAGING RESULTS:   [x]      I have personally reviewed the actual   [x]    MRI  [x]    CT  []     US  MRI  ABDOMEN 10/22/21  FINDINGS:  Pancreas: There is main pancreatic duct dilatation and common bile duct  dilatation to the level of the pancreatic head where there is a 3 cm x 2.1 cm  mass. Liver/bile ducts: There are innumerable suspicious appearing lesions seen  throughout the liver consistent with metastatic disease. Largest right hepatic  lobe lesion measures approximately 4 cm x 4.1 cm. Largest left hepatic lobe  lesion measures approximately 2.3 cm x 2.3 cm. Some of the hepatic metastatic  lesions are necrotic.  There is marked intrahepatic biliary dilatation and common  bile duct dilatation. The bile duct measures 1.5 cm in diameter and is truncated  at the level of a 3 cm x 2.1 cm x 5 cm pancreatic head/uncinate mass. Pancreatic  mass appears to contact the right aspect of the SMV. Gallbladder: There is sludge within the gallbladder. The gallbladder is  distended. There is no appreciable gallbladder wall thickening. There is trace  pericholecystic fluid and perihepatic free intraperitoneal fluid. Spleen: The spleen is normal.  Adrenals: The adrenal glands are normal.  Kidneys: The left kidney measures 9.7 cm in sagittal length. The right kidney  measures 7 cm and sagittal length. There are several small renal cysts. There is  no hydronephrosis.     IMPRESSION  1. 3 cm x 2.1 cm x 5 cm obstructing pancreatic head/uncinate mass consistent  with neoplasm, specifically adenocarcinoma. 2. Innumerable hepatic metastatic lesions. CT ABDOMEN/PELVIS with contrast 10/20/21  FINDINGS:   Lung Bases: Clear. Liver: There are multiple ill-defined hypodense masses/nodules with mild rim  enhancement scattered throughout, the largest measures 2.7 x 3.1 x 2.9 cm in  segment one lateral to the IVC. Gallbladder: Elongated and distended gallbladder noted. Biliary System: There is moderate to significant intrahepatic ductal dilatation  and marked common bile duct dilatation, measuring up to 1.6 cm. Marked  pancreatic ductal dilatation also demonstrated. Abrupt caliber change at very  distal common bile duct and proximal pancreatic duct above the level of ampulla. Spleen: Normal.  Pancreas: There is a large ill-defined hypodense mass identified in the uncinate  process, roughly measures 2.1 x 3.0 x 5.0 cm. There is moderate mass effect with  lateral displacement SMA/SMV and partial encasement of SMV. Bowel: The small and large bowel are nondilated. Adrenal Glands: Normal.  Kidneys: 1 cm cortical cyst in the lateral midpole of right kidney. Lower genitourinary system: The bladder is poorly distended. The uterus is  lobulated. There is 3.3 x 3.4 cm heterogeneous enhancing mass exophytically seen  in anterior uterus. Peritoneum/Retroperitoneum: No adenopathy. Vasculature: Moderate atherosclerotic aortic disease. Other: Small free fluid in the pelvic floor. .  Mild spondylosis.     IMPRESSION  1. Large ill-defined hypoenhancing mass in the uncinate process with mass effect  and displacement of SMA/SMV, most concerned for primary malignancy. 2. Numerous ill-defined hypodense masses in the liver, most compatible with  metastasis. 3. Marked intrahepatic and extrahepatic biliary dilatation, likely mass effect  by tumor compression in the region of ampulla. 4. No lymphadenopathy. 5. Small left renal cyst. Small pelvic free fluid. Recommendations/Plan:      Active Problems:    Obstructive jaundice (10/22/2021)       ___________________________________________________  RECOMMENDATIONS:    71yo F with painless obstructive jaundice associated with abnl GI imaging showing large obstructing pancreatic head mass and innumerable large and small hepatic mets by CT and MRI. The appearance is suggestive of adenocarcinoma Her elevated lipase may represent pancreatic inflammation related to the mass, but the pancreas does not appear inflamed by CT/MRI criteria, nor does she have pain to suggest pancreatitis and PO has not caused her pain. The mets and nature of lesion make it unresectable.   Plan:  1) IVF  2) Allow diet, then NPO after MN on Monday  3) ERCP at 1030 am, Monday for relief of obstruction and tissue sampling  4) If ERCP fails, will need PTC the next day  5) CA 19-9 reviewed  6) Oncology consultation today as needs marci discussion that her prognosis is poor and cure is unlikely    Discussed Code Status:    [x]    Full Code      []    DNR    ___________________________________________________  Care Plan discussed with:    [x]    Patient   []    Family   [x]    Nursing   [x]    Attending- Temnpcarina  Total Time : 50   minutes   ___________________________________________________  GI: Mariluz Berg MD

## 2021-10-23 NOTE — PROGRESS NOTES
Hospitalist Progress Note    NAME: Nahun De Los Santos   :  1955   MRN:  [id number]     I reviewed pertinent labs and imaging, and discussed /agreed on the plan of care with Dr. Gerhardt Bookbinder. Assessment / Plan:  Obstructive Jaundice r/t new findings of Pancreatic Mass with Multiple Liver Lesions   Likely Pancreatic Cancer with Liver Metastases   Elevated lipase  -Patient was transferred from [facility] for specialist evaluation   -Recent unintentional weight loss reported. Went to ED when she noticed her eyes began turning yellow. Does not have any abdominal pain currently. -CT abd/pelvis        1. Large ill-defined hypoenhancing mass in the uncinate process with mass effect  and displacement of SMA/SMV, most concerned for primary malignancy. 2. Numerous ill-defined hypodense masses in the liver, most compatible with  metastasis. 3. Marked intrahepatic and extrahepatic biliary dilatation, likely mass effect  by tumor compression in the region of ampulla. 4. No lymphadenopathy. 5. Small left renal cyst. Small pelvic free fluid. -MRCP        1. 3 cm x 2.1 cm x 5 cm obstructing pancreatic head/uncinate mass consistent  with neoplasm, specifically adenocarcinoma. 2. Innumerable hepatic metastatic lesions.  -Total Bilirubin 8.6 today - follow   -LFTs and lipase elevated - follow   -Continue IVF   -Appreciate GI input - Plan for ERCP on Monday   -Consult to Oncology   -Patient will need to establish oncology team in [place] (where she was transferred from)  -Overall poor prognosis, will consider palliative care consult Monday     Protein Malnutrition   -BMI 17  -Add ensure   -Consult to Dietician     less than 18.5 Underweight / There is no height or weight on file to calculate BMI.     Estimated discharge date:   Barriers: ERCP     Code status: Full  Prophylaxis: SCD's  Recommended Disposition: Home w/Family     Subjective:     Chief Complaint / Reason for Physician Visit  Patient seen at bedside with her  and son. Discussed plan of care, they all verbalized understanding/aggrement. Discussed with RN events overnight. Review of Systems:  Symptom Y/N Comments  Symptom Y/N Comments   Fever/Chills n   Chest Pain n    Poor Appetite y   Edema n    Cough n   Abdominal Pain n    Sputum n   Joint Pain n    SOB/SWENSON n   Pruritis/Rash n    Nausea/vomit n   Tolerating PT/OT     Diarrhea n   Tolerating Diet y    Constipation n   Other       Could NOT obtain due to:      Objective:     VITALS:   Last 24hrs VS reviewed since prior progress note. Most recent are:  Patient Vitals for the past 24 hrs:   Temp Pulse Resp BP SpO2   10/23/21 0736 98.5 °F (36.9 °C) 72 18 (!) 155/85 100 %   10/23/21 0340 98 °F (36.7 °C) 70 18 (!) 148/74 98 %   10/22/21 2320 98.6 °F (37 °C) 73 20 (!) 153/75 98 %   10/22/21 2015 98.4 °F (36.9 °C) 82 18 (!) 156/80 100 %   10/22/21 1749 98.6 °F (37 °C) 83 16 (!) 156/82 99 %       Intake/Output Summary (Last 24 hours) at 10/23/2021 1110  Last data filed at 10/23/2021 0002  Gross per 24 hour   Intake 250 ml   Output    Net 250 ml        I had a face to face encounter and independently examined this patient on 10/23/2021, as outlined below:  PHYSICAL EXAM:  General: WD, WN. Alert, cooperative, frail female in no acute distress    EENT:  EOMI. icteric sclerae. MMM  Resp:  CTA bilaterally, no wheezing or rales. No accessory muscle use  CV:  Regular  rhythm,  No edema  GI:  Soft, Non distended, Non tender. +Bowel sounds  Neurologic:  Alert and oriented X 3, normal speech,   Psych:   Good insight. Not anxious nor agitated  Skin:  No rashes.   No jaundice    Reviewed most current lab test results and cultures  YES  Reviewed most current radiology test results   YES  Review and summation of old records today    NO  Reviewed patient's current orders and MAR    YES  PMH/SH reviewed - no change compared to H&P  ________________________________________________________________________  Care Plan discussed with:    Comments   Patient x    Family  x Son and     RN x    Care Manager x    Consultant  x GI                     Multidiciplinary team rounds were held today with , nursing, pharmacist and clinical coordinator. Patient's plan of care was discussed; medications were reviewed and discharge planning was addressed. ________________________________________________________________________  Total NON critical care TIME:  25   Minutes    Total CRITICAL CARE TIME Spent:   Minutes non procedure based      Comments   >50% of visit spent in counseling and coordination of care x    ________________________________________________________________________  Shaista Burr NP     Procedures: see electronic medical records for all procedures/Xrays and details which were not copied into this note but were reviewed prior to creation of Plan. LABS:  I reviewed today's most current labs and imaging studies.   Pertinent labs include:  Recent Labs     10/23/21  0342 10/22/21  0859 10/21/21  0400   WBC 10.6 10.7 11.2   HGB 10.7* 11.4* 11.6*   HCT 31.6* 33.4* 33.7*    347 359     Recent Labs     10/23/21  0342 10/22/21  0859 10/21/21  0400    139 138   K 3.5 3.4* 3.7    107 102   CO2 28 27 31   * 95 106*   BUN 2* 4* 8   CREA 0.33* 0.38* 0.43*   CA 9.0 7.7* 8.4*   MG 1.8  --  2.2   ALB 1.9* 2.1* 2.5*   TBILI 8.6* 8.0* 7.5*   * 406* 505*   INR 1.2*  --   --        Signed: Shaista Burr, NP

## 2021-10-24 NOTE — PROGRESS NOTES
End of Shift Note    Bedside shift change report given to Jossie (oncoming nurse) by Nayely Oneal RN (offgoing nurse). Report included the following information SBAR, Kardex, OR Summary, Procedure Summary, Intake/Output and MAR    Shift worked:  5400-8374     Shift summary and any significant changes:     Pt remained stable throughout shift. Pt rang out for pain in abdomen and prn medication given per MAR. Safety rounding done. Concerns for physician to address:      Zone phone for oncoming shift:          Activity:  Activity Level: Up ad el  Number times ambulated in hallways past shift: 0  Number of times OOB to chair past shift: 0    Cardiac:   Cardiac Monitoring: No      Cardiac Rhythm: Sinus Rhythm    Access:   Current line(s): PIV     Genitourinary:   Urinary status: voiding    Respiratory:   O2 Device: None (Room air)  Chronic home O2 use?: NO  Incentive spirometer at bedside: N/A     GI:     Current diet:  ADULT DIET Regular  DIET NPO  ADULT ORAL NUTRITION SUPPLEMENT Breakfast, Lunch, Dinner; Standard High Calorie/High Protein  ADULT ORAL NUTRITION SUPPLEMENT Breakfast, Lunch, Dinner; Standard High Calorie/High Protein  Passing flatus: YES  Tolerating current diet: YES       Pain Management:   Patient states pain is manageable on current regimen: YES    Skin:  Simon Score: 22  Interventions: increase time out of bed    Patient Safety:  Fall Score:  Total Score: 1  Interventions: pt to call before getting OOB       Length of Stay:  Expected LOS: - - -  Actual LOS: 2      Nayely Oneal RN

## 2021-10-24 NOTE — CONSULTS
[facility]    Name:  Ellie Roca  MR#:  [id number]  :  1955  ACCOUNT #:  [de-identified]  DATE OF SERVICE:  10/23/2021    HEMATOLOGY/ONCOLOGY CONSULT NOTE    CHIEF COMPLAINT:  Abdominal pain. HISTORY OF PRESENT ILLNESS:  The patient is a very charming 80-year-old woman with really no previous medical history who over the course of last month has noticed weight loss and epigastric pain. She was seen at an outside hospital.  A note was made of abnormalities in her pancreas and liver, and she was referred to this hospital for further evaluation and treatment. CT scan done on 10/20/2021, unfortunately showed multiple ill-defined hypodense masses and nodules with rim enhancement scattered throughout the liver, largest measuring 2.7 x 3.1 x 2.9 cm. Note was also made of marked pancreatic ductal dilation and enlarged ill-defined hypodense mass identified in an uncinate process measuring 2.1 x 3.0 x 5.0 cm with moderate mass effect with lateral expansion of SMA and SMV, and partial infusion of the SMV. There also was a note made of a 3.3 x 3.4 cm heterogeneous enhancing mass exophytically seen in anterior uterus. Of interest, she had lab test done, and her lipase was elevated at 5060 today down to 1861. She does have a mild normocytic anemia, and her renal function is normal at 0.23. She and her family are concerned about the radiographic findings. She has been tentatively setup for an ERCP by Dr. Angele Aschoff on Monday. PAST MEDICAL HISTORY:  None. MEDICATIONS AS AN OUTPATIENT:  None. ALLERGIES:  NONE. SOCIAL HISTORY:  She is . She lives in [place], [place]. FAMILY HISTORY:  Father with colon cancer, age 59. Mother with renal cell carcinoma. REVIEW OF SYSTEMS:  GENERAL:  No fevers or chills. HEENT:  No auditory or visual change. LYMPHATIC:  No lumps or bumps in neck, underarm, or groin.   CARDIOVASCULAR:  No chest pain or palpitations. PULMONARY:  No cough or shortness of breath. GASTROINTESTINAL:  As above. GENITOURINARY:  No dysuria or incontinence. SKIN:  No rash or changing mole. MUSCULOSKELETAL:  No red or swollen joints. NEUROLOGIC:  No irritability or syncope. PHYSICAL EXAMINATION:  GENERAL:  Pleasant, in no acute distress. VITAL SIGNS:  /83, pulse 78, temperature 97.5, satting 100% on room air. HEENT:  Sclerae anicteric. Oropharynx clear. NECK:  Supple. No lymphadenopathy or thyromegaly. LUNGS:  Clear to auscultation bilaterally. HEART:  Regular rate and rhythm without murmur, rub, or gallop. ABDOMEN:  Nontender and nondistended. Normoactive bowel sounds. No hepatosplenomegaly. EXTREMITIES:  No clubbing, cyanosis, or edema. PSYCHIATRIC:  Normal mood and affect. Alert and oriented x3. ASSESSMENT AND PLAN:  A 52-year-old previously healthy woman admitted and transferred from a hospital in [place] for workup of abdominal pain with pancreatic and liver lesions concerning but not diagnostic for metastatic pancreatic adenocarcinoma, elevated lipase, but normal CA 19-9, family history of colon cancer in her father in his 62s. 1.  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 Case was discussed with patient,  at bedside. Final recommendations depend on final pathology. We will follow with you. 2.  Prophylaxis:  Given increased risk for thrombosis with pancreatic or biliary malignancies, recommend Lovenox today and tomorrow. We will hold on Monday for procedure.       Vic Meneses MD      [id number]  D:  10/23/2021 14:45  T:  10/23/2021 19:54  JOB #:  [id number]  CC:  Moi Roland MD

## 2021-10-24 NOTE — PROGRESS NOTES
Admission database completed with pt. Pt states she received 2 dose of COVID vaccine in April. Pt states her  currently has her vaccination card. Advised pt to have  bring vaccination card. Pt also states that she has received her yearly flu shot for the current flu season.

## 2021-10-24 NOTE — PROGRESS NOTES
Hospitalist Progress Note    NAME: Leatha Benites   :  1955   MRN:  [id number]     I reviewed pertinent labs and imaging, and discussed /agreed on the plan of care with Dr. Benji Mar. Assessment / Plan:  Obstructive Jaundice r/t new findings of Pancreatic Mass with Multiple Liver Lesions   Likely Pancreatic Cancer with Liver Metastases   Elevated lipase  -Patient was transferred from [facility] for specialist evaluation   -Recent unintentional weight loss reported. Went to ED when she noticed her eyes began turning yellow. Does not have any abdominal pain currently. -CT abd/pelvis        1. Large ill-defined hypoenhancing mass in the uncinate process with mass effect  and displacement of SMA/SMV, most concerned for primary malignancy. 2. Numerous ill-defined hypodense masses in the liver, most compatible with  metastasis. 3. Marked intrahepatic and extrahepatic biliary dilatation, likely mass effect  by tumor compression in the region of ampulla. 4. No lymphadenopathy. 5. Small left renal cyst. Small pelvic free fluid. -MRCP        1. 3 cm x 2.1 cm x 5 cm obstructing pancreatic head/uncinate mass consistent  with neoplasm, specifically adenocarcinoma. 2. Innumerable hepatic metastatic lesions.  -Total Bilirubin 8.8 today - follow   -LFTs and lipase elevated - follow   -Continue IVF   -Appreciate GI input - Plan for ERCP on Monday   -Appreciate Oncology input    -Patient will need to establish oncology team in [place] (where she was transferred from)  -Overall poor prognosis, will consider palliative care consult Monday     Protein Malnutrition   -BMI 17  -Continue ensure   -Consult to Dietician     less than 18.5 Underweight / There is no height or weight on file to calculate BMI.     Estimated discharge date:   Barriers: ERCP     Code status: Full  Prophylaxis: SCD's  Recommended Disposition: Home w/Family     Subjective:     Chief Complaint / Reason for Physician Visit  Patient seen at bedside, she has no complaints. Discussed plan of care, she verbalized understanding/aggrement. Discussed with RN events overnight. Review of Systems:  Symptom Y/N Comments  Symptom Y/N Comments   Fever/Chills n   Chest Pain n    Poor Appetite y   Edema n    Cough n   Abdominal Pain n    Sputum n   Joint Pain n    SOB/SWENSON n   Pruritis/Rash n    Nausea/vomit n   Tolerating PT/OT     Diarrhea n   Tolerating Diet y    Constipation n   Other       Could NOT obtain due to:      Objective:     VITALS:   Last 24hrs VS reviewed since prior progress note. Most recent are:  Patient Vitals for the past 24 hrs:   Temp Pulse Resp BP SpO2   10/24/21 0751 98.4 °F (36.9 °C) 80 18 139/75 98 %   10/23/21 2330 98 °F (36.7 °C) 68 19 117/65 98 %   10/23/21 1929 99.3 °F (37.4 °C) 85 18 129/64 100 %   10/23/21 1529 98.3 °F (36.8 °C) 70 18 138/77 97 %   10/23/21 1113 97.5 °F (36.4 °C) 78 18 132/83 100 %     No intake or output data in the 24 hours ending 10/24/21 1106     I had a face to face encounter and independently examined this patient on 10/24/2021, as outlined below:  PHYSICAL EXAM:  General: WD, WN. Alert, cooperative, frail female in no acute distress    EENT:  EOMI. icteric sclerae. MMM  Resp:  CTA bilaterally, no wheezing or rales. No accessory muscle use  CV:  Regular  rhythm,  No edema  GI:  Soft, Non distended, Non tender. +Bowel sounds  Neurologic:  Alert and oriented X 3, normal speech,   Psych:   Good insight. Not anxious nor agitated  Skin:  No rashes.   No jaundice    Reviewed most current lab test results and cultures  YES  Reviewed most current radiology test results   YES  Review and summation of old records today    NO  Reviewed patient's current orders and MAR    YES  PMH/SH reviewed - no change compared to H&P  ________________________________________________________________________  Care Plan discussed with:    Comments   Patient x    Family      RN x    Care Manager x    Consultant Multidiciplinary team rounds were held today with , nursing, pharmacist and clinical coordinator. Patient's plan of care was discussed; medications were reviewed and discharge planning was addressed. ________________________________________________________________________  Total NON critical care TIME:  25   Minutes    Total CRITICAL CARE TIME Spent:   Minutes non procedure based      Comments   >50% of visit spent in counseling and coordination of care x    ________________________________________________________________________  Jonel Nuno NP     Procedures: see electronic medical records for all procedures/Xrays and details which were not copied into this note but were reviewed prior to creation of Plan. LABS:  I reviewed today's most current labs and imaging studies.   Pertinent labs include:  Recent Labs     10/23/21  0342 10/22/21  0859   WBC 10.6 10.7   HGB 10.7* 11.4*   HCT 31.6* 33.4*    347     Recent Labs     10/24/21  0520 10/23/21  0342 10/22/21  0859   * 136 139   K 3.8 3.5 3.4*    104 107   CO2 28 28 27   * 111* 95   BUN 5* 2* 4*   CREA 0.46* 0.33* 0.38*   CA 9.1 9.0 7.7*   MG  --  1.8  --    ALB 2.0* 1.9* 2.1*   TBILI 8.8* 8.6* 8.0*   * 352* 406*   INR  --  1.2*  --        Signed: Jonel Nuno NP

## 2021-10-24 NOTE — PROGRESS NOTES
GI Progress Note Ruel So)  NAME:Jaymie Camarillo :1955 FCV:[id number]   ATTG: Selena Haji MD/Cory Blake NP PCP: None  Date/Time:  10/24/2021 7:46 AM   Assessment:   · Painless obstructive jaundice  · Abnl GI CT with pancreatic mass and multiple large hepatic mets     Plan:   · NPO after MN  · ERCP in OR tomorrow at 1030 with GA with attempted tissue sampling and stent  · If ERCP unsuccessful, will need PTC the next day     Subjective:   Discussed with RN events overnight. Feels well. No pain with PO    Complaint Y/N Description   Abdominal Pain n    Hematemesis n    Hematochezia n    Melena n    Constipation n    Diarrhea n    Dyspepsia n    Dysphagia n    Jaundiced y    Nausea/vomiting n      Review of Systems:  Symptom Y/N Comments  Symptom Y/N Comments   Fever/Chills n   Chest Pain n    Cough n   Headaches n    Sputum n   Joint Pain n    SOB/SWENSON n   Pruritis/Rash n    Tolerating Diet y   Other       Could NOT obtain due to:      Objective:   VITALS:   Last 24hrs VS reviewed since prior progress note. Most recent are:  Visit Vitals  /65 (BP 1 Location: Right upper arm, BP Patient Position: At rest)   Pulse 68   Temp 98 °F (36.7 °C)   Resp 19   SpO2 98%     No intake or output data in the 24 hours ending 10/24/21 0746  PHYSICAL EXAM:  General: WD, WN. Alert, cooperative, no acute distress    HEENT: NC, Atraumatic. PERRLI. +icteric sclerae. Lungs:  CTA Bilaterally. No Wheezing/Rhonchi/Rales. Heart:  Regular  rhythm,  No murmur/Rub/Gallops  Abdomen: Soft, Non distended, Non tender.  +Bowel sounds, no HSM  Extremities: No c/c/e  Neurologic:  CN 2-12 gi, A/O X 3. No acute neurological distress   Psych:   Good insight. Not anxious nor agitated.     Lab and Radiology Data Reviewed: (see below)    Medications Reviewed: (see below)  PMH/SH reviewed - no change compared to H&P  ________________________________________________________________________  Total time spent with patient: 15 minutes ________________________________________________________________________  Care Plan discussed with:  Patient y   Family     RN y              Consultant:       Sammy Holbrook MD     Procedures: see electronic medical records for all procedures/Xrays and details which were not copied into this note but were reviewed prior to creation of Plan. LABS:  Recent Labs     10/23/21  0342 10/22/21  0859   WBC 10.6 10.7   HGB 10.7* 11.4*   HCT 31.6* 33.4*    347     Recent Labs     10/24/21  0520 10/23/21  0342 10/22/21  0859   * 136 139   K 3.8 3.5 3.4*    104 107   CO2 28 28 27   BUN 5* 2* 4*   CREA 0.46* 0.33* 0.38*   * 111* 95   CA 9.1 9.0 7.7*   MG  --  1.8  --      Recent Labs     10/24/21  0520 10/23/21  0342 10/22/21  0859   * 433* 445*   TP 6.1* 6.0* 6.0*   ALB 2.0* 1.9* 2.1*   GLOB 4.1* 4.1* 3.9   LPSE  --  1,861* 3,307*     Recent Labs     10/23/21  0342   INR 1.2*   PTP 12.5*      No results for input(s): FE, TIBC, PSAT, FERR in the last 72 hours. No results found for: FOL, RBCF  No results for input(s): PH, PCO2, PO2 in the last 72 hours. No results for input(s): CPK, CKMB in the last 72 hours.     No lab exists for component: TROPONINI  Lab Results   Component Value Date/Time    Color DARK YELLOW 10/21/2021 01:30 AM    Appearance CLEAR 10/21/2021 01:30 AM    Specific gravity >1.030 (H) 10/21/2021 01:30 AM    pH (UA) 5.5 10/21/2021 01:30 AM    Protein 30 (A) 10/21/2021 01:30 AM    Glucose 250 (A) 10/21/2021 01:30 AM    Ketone TRACE (A) 10/21/2021 01:30 AM    Bilirubin LARGE (A) 10/21/2021 01:30 AM    Urobilinogen 1.0 10/21/2021 01:30 AM    Nitrites Positive (A) 10/21/2021 01:30 AM    Leukocyte Esterase SMALL (A) 10/21/2021 01:30 AM    Epithelial cells 3+ 10/21/2021 01:30 AM    Bacteria 3+ (A) 10/21/2021 01:30 AM    WBC 0 to 3 10/21/2021 01:30 AM    RBC Negative 10/21/2021 01:30 AM       MEDICATIONS:  Current Facility-Administered Medications   Medication Dose Route Frequency    enoxaparin (LOVENOX) injection 30 mg  30 mg SubCUTAneous Q24H    famotidine (PEPCID) tablet 20 mg  20 mg Oral DAILY    polyethylene glycol (MIRALAX) packet 17 g  17 g Oral DAILY    sodium chloride (NS) flush 5-40 mL  5-40 mL IntraVENous Q8H    sodium chloride (NS) flush 5-40 mL  5-40 mL IntraVENous PRN    acetaminophen (TYLENOL) tablet 650 mg  650 mg Oral Q6H PRN    Or    acetaminophen (TYLENOL) suppository 650 mg  650 mg Rectal Q6H PRN    ondansetron (ZOFRAN ODT) tablet 4 mg  4 mg Oral Q8H PRN    Or    ondansetron (ZOFRAN) injection 4 mg  4 mg IntraVENous Q6H PRN    senna (SENOKOT) tablet 8.6 mg  1 Tablet Oral DAILY    bisacodyL (DULCOLAX) tablet 5 mg  5 mg Oral DAILY PRN    morphine injection 2 mg  2 mg IntraVENous Q4H PRN    oxyCODONE IR (ROXICODONE) tablet 5 mg  5 mg Oral Q4H PRN

## 2021-10-24 NOTE — PROGRESS NOTES
Hematology-Oncology Progress Note      Allie Stein  1955  [id number]  10/24/2021      Subjective:     Pain controlled. No new symptoms. Explains that she lives > 1 1/2 hours away and would prefer to receive f/u cancer care with her 's oncologist at [facility] in [facility]. Does not remember his/her name. Allergies: Patient has no known allergies.   Current Facility-Administered Medications   Medication Dose Route Frequency Provider Last Rate Last Admin    dextrose 5% and 0.9% NaCl infusion  50 mL/hr IntraVENous CONTINUOUS Chula Slate L, NP 50 mL/hr at 10/24/21 1135 50 mL/hr at 10/24/21 1135    enoxaparin (LOVENOX) injection 30 mg  30 mg SubCUTAneous Q24H Wagner Sherman MD   30 mg at 10/23/21 1535    famotidine (PEPCID) tablet 20 mg  20 mg Oral DAILY Jerri Smith MD   20 mg at 10/24/21 0824    polyethylene glycol (MIRALAX) packet 17 g  17 g Oral DAILY Jerri Smith MD   17 g at 10/24/21 0824    sodium chloride (NS) flush 5-40 mL  5-40 mL IntraVENous Q8H Lien Trent MD   10 mL at 10/24/21 0602    sodium chloride (NS) flush 5-40 mL  5-40 mL IntraVENous PRN Lien Trent MD   10 mL at 10/23/21 0006    acetaminophen (TYLENOL) tablet 650 mg  650 mg Oral Q6H PRN Jerri Smith MD        Or   Dipti Lu acetaminophen (TYLENOL) suppository 650 mg  650 mg Rectal Q6H PRN Lien Trent MD        ondansetron (ZOFRAN ODT) tablet 4 mg  4 mg Oral Q8H PRN Lien Trent MD        Or    ondansetron [facility]) injection 4 mg  4 mg IntraVENous Q6H PRN Lien Trent MD        senna (SENOKOT) tablet 8.6 mg  1 Tablet Oral DAILY Arvin Trent MD   8.6 mg at 10/24/21 0824    bisacodyL (DULCOLAX) tablet 5 mg  5 mg Oral DAILY PRN Jerri Smith MD        morphine injection 2 mg  2 mg IntraVENous Q4H PRN Jerri Smith MD   2 mg at 10/23/21 0006    oxyCODONE IR (ROXICODONE) tablet 5 mg  5 mg Oral Q4H PRN Twan, Tana Larsen MD   5 mg at 10/24/21 1035     Objective:     Patient Vitals for the past 24 hrs:   BP Temp Pulse Resp SpO2 Height Weight   10/24/21 1136       50.3 kg (110 lb 14.3 oz)   10/24/21 1103      5' 6\" (1.676 m)    10/24/21 0751 139/75 98.4 °F (36.9 °C) 80 18 98 %     10/23/21 2330 117/65 98 °F (36.7 °C) 68 19 98 %     10/23/21 1929 129/64 99.3 °F (37.4 °C) 85 18 100 %     10/23/21 1529 138/77 98.3 °F (36.8 °C) 70 18 97 %         Gen: NAD  HEENT: PERRL, Sclerae anicteric  Cv: RRR without m/r/g  Pulm: CTA bilaterally  Abd: NABS, NTND, No HSM  Ext: No c/c/e    Available labs reviewed:  Labs:    Recent Results (from the past 24 hour(s))   METABOLIC PANEL, COMPREHENSIVE    Collection Time: 10/24/21  5:20 AM   Result Value Ref Range    Sodium 135 (L) 136 - 145 mmol/L    Potassium 3.8 3.5 - 5.1 mmol/L    Chloride 103 97 - 108 mmol/L    CO2 28 21 - 32 mmol/L    Anion gap 4 (L) 5 - 15 mmol/L    Glucose 112 (H) 65 - 100 mg/dL    BUN 5 (L) 6 - 20 MG/DL    Creatinine 0.46 (L) 0.55 - 1.02 MG/DL    BUN/Creatinine ratio 11 (L) 12 - 20      GFR est AA >60 >60 ml/min/1.73m2    GFR est non-AA >60 >60 ml/min/1.73m2    Calcium 9.1 8.5 - 10.1 MG/DL    Bilirubin, total 8.8 (H) 0.2 - 1.0 MG/DL    ALT (SGPT) 321 (H) 12 - 78 U/L    AST (SGOT) 179 (H) 15 - 37 U/L    Alk. phosphatase 432 (H) 45 - 117 U/L    Protein, total 6.1 (L) 6.4 - 8.2 g/dL    Albumin 2.0 (L) 3.5 - 5.0 g/dL    Globulin 4.1 (H) 2.0 - 4.0 g/dL    A-G Ratio 0.5 (L) 1.1 - 2.2           Assessment and Plan     74 y/o previously healthy woman admitted in transfer from [place] for pancreatic lesion and innumerable liver lesions. Tentatively scheduled for biopsy with Dr. Tyler Elena tomorrow. 1. Pancreatic mass: biopsy tomorrow. Will need f/u closer to home. Will ask MSW to help. Hold lovenox. 2. Pain: controlled. Thank you for allowing us to participate in the care of this very pleasant patient.     Jonathon Rain MD  Hematology/Oncology  Phone [phone]

## 2021-10-24 NOTE — PROGRESS NOTES
Comprehensive Nutrition Assessment    Type and Reason for Visit: Initial, Consult (low BMI)    Nutrition Recommendations/Plan:   Continue regular diet  Ensure enlive TID (vanilla)  Please document % meals and supplements consumed in flowsheet I/O's under intake     Need updated weight, pt not weighed since admission. Nutrition Assessment:      Chart reviewed for low BMI and consult received for unintended weight loss. Pt noted for obstructive jaundice, new pancreatic mass w/ liver lesions, suspected pancreatic ca w/ liver mets, malnutrition, abdominal discomfort x2-3 weeks not worse with eating. Plans for ERCP tomorrow. Pt reports no changes in her PO intake, even when she does not feel hungry she ensures she eats anyway. She reports always being small, max weight in the 130lbs even during pregnancy. She reports her usual weight is ~125lb; she was 127lb in April at [facility]. Most recent weight from 10/20 was 111lb indicating 16lb (13%) wt loss over 6 months, severe for the time frame. Ensure enlive already ordered TID, pt agreeable to continue. Will continue monitoring. Encourage intake of meals and supplements. Malnutrition Assessment:  Malnutrition Status: Moderate malnutrition    Context:  Chronic illness     Findings of the 6 clinical characteristics of malnutrition:   Energy Intake:  No significant decrease in energy intake  Weight Loss:  7.00 - Greater than 10% over 6 months     Body Fat Loss:  1 - Mild body fat loss,     Muscle Mass Loss:  1 - Mild muscle mass loss,    Fluid Accumulation:  No significant fluid accumulation,     Strength:  Not performed         Estimated Daily Nutrient Needs:  Energy (kcal): 1634 kcals (BMR x 1.3AF + 250 kcals); Weight Used for Energy Requirements: Current  Protein (g): 60-70g (1.2-1.4g/kg); Weight Used for Protein Requirements: Current  Fluid (ml/day): 1600mL;  Method Used for Fluid Requirements: 1 ml/kcal      Nutrition Related Findings:  Labs: lipase 1861, elevated LFTs. Meds: miralax, senna, NS, oxycodone. BM 10/23.       Wounds:    None       Current Nutrition Therapies:  ADULT DIET Regular  DIET NPO  ADULT ORAL NUTRITION SUPPLEMENT Breakfast, Lunch, Dinner; Standard High Calorie/High Protein  ADULT ORAL NUTRITION SUPPLEMENT Breakfast, Lunch, Dinner; Standard High Calorie/High Protein    Anthropometric Measures:  · Height:  5' 6\" (167.6 cm)  · Current Body Wt:  50.3 kg (111 lb)     · Usual Body Wt:  56.7 kg (125 lb)     · Ideal Body Wt:  130 lbs:  85.4 %   · BMI Category:  Underweight (BMI less than 18.5)       Nutrition Diagnosis:   · Unintended weight loss related to catabolic illness (new pancreatic mass w/ suspected liver mets) as evidenced by BMI, weight loss, weight loss greater than or equal to 10% in 6 months      Nutrition Interventions:   Food and/or Nutrient Delivery: Continue current diet, Continue oral nutrition supplement  Nutrition Education and Counseling: No recommendations at this time  Coordination of Nutrition Care: Continue to monitor while inpatient, Interdisciplinary rounds    Goals:  PO intake >75% meals/supplements next 2-4 days       Nutrition Monitoring and Evaluation:   Behavioral-Environmental Outcomes: None identified  Food/Nutrient Intake Outcomes: Food and nutrient intake, Supplement intake  Physical Signs/Symptoms Outcomes: Biochemical data, GI status, Weight    Discharge Planning:    Continue oral nutrition supplement, Continue current diet     Electronically signed by Teresita Goltz, RD on 10/24/2021 at 11:09 AM    Contact: [phone]

## 2021-10-24 NOTE — PROGRESS NOTES
Problem: Falls - Risk of  Goal: *Absence of Falls  Description: Document Starleen Freeze Fall Risk and appropriate interventions in the flowsheet.   Outcome: Progressing Towards Goal  Note: Fall Risk Interventions:            Medication Interventions: Assess postural VS orthostatic hypotension

## 2021-10-25 NOTE — PERIOP NOTES
TRANSFER - IN REPORT:    Verbal report received from [facility] on [facility]  being received from [phone] for ordered procedure      Report consisted of patients Situation, Background, Assessment and   Recommendations(SBAR). Information from the following report(s) SBAR, ED Summary, Intake/Output and MAR was reviewed with the receiving nurse. Opportunity for questions and clarification was provided. Assessment completed upon patients arrival to unit and care assumed.

## 2021-10-25 NOTE — PROGRESS NOTES
Problem: Falls - Risk of  Goal: *Absence of Falls  Description: Document Savannah Fall Risk and appropriate interventions in the flowsheet.   Outcome: Progressing Towards Goal  Note: Fall Risk Interventions:            Medication Interventions: Evaluate medications/consider consulting pharmacy

## 2021-10-25 NOTE — PROGRESS NOTES
Hematology-Oncology Progress Note      Debbie Kenny  1955  [id number]  10/25/2021      Subjective:     Pain controlled. No new symptoms. Allergies: Patient has no known allergies.   Current Facility-Administered Medications   Medication Dose Route Frequency Provider Last Rate Last Admin    dextrose 5% and 0.9% NaCl infusion  50 mL/hr IntraVENous CONTINUOUS Wong ZHENG NP 50 mL/hr at 10/24/21 1135 50 mL/hr at 10/24/21 1135    famotidine (PEPCID) tablet 20 mg  20 mg Oral DAILY Nidhi Herron MD   20 mg at 10/24/21 0824    polyethylene glycol (MIRALAX) packet 17 g  17 g Oral DAILY Nidhi Herron MD   17 g at 10/24/21 9436    sodium chloride (NS) flush 5-40 mL  5-40 mL IntraVENous Q8H Alexandr Trent MD   10 mL at 10/25/21 0010    sodium chloride (NS) flush 5-40 mL  5-40 mL IntraVENous PRN Josie Trent MD   10 mL at 10/23/21 0006    acetaminophen (TYLENOL) tablet 650 mg  650 mg Oral Q6H PRN Nidhi Herron MD        Or   [employer] acetaminophen (TYLENOL) suppository 650 mg  650 mg Rectal Q6H PRN Josie Trent MD        ondansetron (ZOFRAN ODT) tablet 4 mg  4 mg Oral Q8H PRN Josie Trent MD        Or    ondansetron [facility]) injection 4 mg  4 mg IntraVENous Q6H PRN Josie Trent MD        senna (SENOKOT) tablet 8.6 mg  1 Tablet Oral DAILY Al-Dabbagh, Haynes Cushing M, MD   8.6 mg at 10/24/21 1555    bisacodyL (DULCOLAX) tablet 5 mg  5 mg Oral DAILY PRN Nidhi Herron MD        morphine injection 2 mg  2 mg IntraVENous Q4H PRN Alexandr Trent MD   2 mg at 10/23/21 0006    oxyCODONE IR (ROXICODONE) tablet 5 mg  5 mg Oral Q4H PRN Nidhi Herron MD   5 mg at 10/25/21 0010     Objective:     Patient Vitals for the past 24 hrs:   BP Temp Pulse Resp SpO2 Height Weight   10/25/21 0742 (!) 149/81 98.1 °F (36.7 °C) 99 16 98 %     10/24/21 2336 (!) 146/71 98.5 °F (36.9 °C) 93 16 100 %     10/24/21 2013 133/68 98.4 °F (36.9 °C) 82 16 97 %   10/24/21 1525 (!) 141/80 97.8 °F (36.6 °C) 78 18 99 %     10/24/21 1136       50.3 kg (110 lb 14.3 oz)   10/24/21 1103      5' 6\" (1.676 m)        Gen: NAD  Resp no distress  Ext: No c/c/e    Available labs reviewed:  Labs:    Recent Results (from the past 24 hour(s))   METABOLIC PANEL, COMPREHENSIVE    Collection Time: 10/25/21  3:30 AM   Result Value Ref Range    Sodium 135 (L) 136 - 145 mmol/L    Potassium 3.9 3.5 - 5.1 mmol/L    Chloride 102 97 - 108 mmol/L    CO2 29 21 - 32 mmol/L    Anion gap 4 (L) 5 - 15 mmol/L    Glucose 115 (H) 65 - 100 mg/dL    BUN 7 6 - 20 MG/DL    Creatinine 0.50 (L) 0.55 - 1.02 MG/DL    BUN/Creatinine ratio 14 12 - 20      GFR est AA >60 >60 ml/min/1.73m2    GFR est non-AA >60 >60 ml/min/1.73m2    Calcium 9.0 8.5 - 10.1 MG/DL    Bilirubin, total 9.4 (H) 0.2 - 1.0 MG/DL    ALT (SGPT) 294 (H) 12 - 78 U/L    AST (SGOT) 157 (H) 15 - 37 U/L    Alk. phosphatase 434 (H) 45 - 117 U/L    Protein, total 6.4 6.4 - 8.2 g/dL    Albumin 2.0 (L) 3.5 - 5.0 g/dL    Globulin 4.4 (H) 2.0 - 4.0 g/dL    A-G Ratio 0.5 (L) 1.1 - 2.2           Assessment and Plan     74 y/o previously healthy woman admitted in transfer from [facility] for pancreatic lesion and innumerable liver lesions. Tentatively scheduled for biopsy with Dr. Josue Blue today. 1. Pancreatic mass: biopsy today. Will need f/u closer to home. Prefer to receive f/u cancer care with her 's oncologist at [facility] in [place]    2. Pain: controlled.

## 2021-10-25 NOTE — PROGRESS NOTES
End of Shift Note    Bedside shift change report given to  (oncoming nurse) by Jil Hancock RN (offgoing nurse). Report included the following information SBAR    Shift worked:  7a-7p     Shift summary and any significant changes:    Pt went for ERCP today, pt on clear liquids per GI and is tolerating, pt is otherwise resting comfortably in room with family member at the bedside   Concerns for physician to address:    Zone phone for oncoming shift:  [phone]     Activity:  Activity Level: Up with Assistance  Number times ambulated in hallways past shift: 0  Number of times OOB to chair past shift: 0    Cardiac:   Cardiac Monitoring: Yes      Cardiac Rhythm: Sinus Rhythm    Access:   Current line(s): PIV     Genitourinary:   Urinary status: voiding    Respiratory:   O2 Device: None (Room air)  Chronic home O2 use?: NO  Incentive spirometer at bedside: NO     GI:     Current diet:  ADULT ORAL NUTRITION SUPPLEMENT Breakfast, Lunch, Dinner; Standard High Calorie/High Protein  ADULT ORAL NUTRITION SUPPLEMENT Breakfast, Lunch, Dinner; Standard High Calorie/High Protein  ADULT DIET Clear Liquid  Passing flatus: YES  Tolerating current diet: YES       Pain Management:   Patient states pain is manageable on current regimen: YES    Skin:  Simon Score: 22  Interventions: Float heels, increase time out of bed     Patient Safety:  Fall Score:  Total Score: 1  Interventions: bed/chair alarm, assistive device (walker, cane, etc), gripper socks and pt to call before getting OOB       Length of Stay:  Expected LOS: 4d 19h  Actual LOS: [address], RN

## 2021-10-25 NOTE — PROCEDURES
NAME:  Grace Meeks   :   1955   MRN:   [id number]     [facility]  Date/Time:  10/25/2021 11:58 AM    Procedure Type:   ERCP with biliary sphincterotomy, biliary stent placement, biliary tissue sampling     Indications: abnormal CT/MRCP, biliary obstruction  Pre-operative Diagnosis: see indication above  Post-operative Diagnosis:  See findings below  : Roxy Griffiths MD  Referring Provider: Anamaria Chowdhury MD; Sita Love. Silvana Torres MD, None    Exam:  Airway: clear, no airway problems anticipated  Heart: RRR, without gallops or rubs  Lungs: clear bilaterally without wheezes, crackles, or rhonchi  Abdomen: soft, nontender, nondistended, bowel sounds present  Mental Status: awake, alert and oriented to person, place and time    Sedation:  General anesthesia  Procedure Details:  After informed consent was obtained with all risks and benefits of procedure explained, the patient was taken to the fluoroscopy suite and placed in the supine position. Upon sequential sedation as per above, the Olympus duodenoscope [id number]   was inserted via the mouthpeice and carefully advanced to the second portion of the duodenum. The quality of visualization was adequate. The duodenoscope was withdrawn into a short position. Findings:   Endoscopic: -normal esophagus, stomach, and duodenum  Ampulla:-normal , with no bile efflux  Cholangiogram: -dilated common bile duct to 16mm with distal tapering to 2cm stricture zone concerning for malignancy  Pancreatogram:not performed    Specimen Removed:  Brushings from  the common bile duct  Complications: None. EBL:  None. Interventions:    Pancreatic: none  Biliary: Initial cannulation attempts in short position resulted in wire guided access to the pancreatic duct. The Sphinctertome was repositioned and wire-guided access of the common bile duct was achieved and confirmed fluoroscopically and via bile aspirate.   Contrast injection demonstrates a dilated common bile duct to 16mm with distal tapering to 2cm stricture zone concerning for malignancy. A sphincterotomy is completed without bleeding,  Brushings are obtained of the stricture zone. A 12mm balloon cannot be pulled through the stricture zone. A 10mm x 60mm Fully-covered self-expanding metal biliary stent is deployed with noted waisting in the distal third portion and immediate ejection of dark bile. The entire endoscopic apparatus is removed and the patient is noted to tolerate the procedure well. Impression:    -Dilated common bile duct to 16mm with distal tapering to 2cm stricture zone concerning for malignancy. A sphincterotomy is completed without bleeding,  Brushings are obtained of the stricture zone. A 12mm balloon cannot be pulled through the stricture zone. A 10mm x 60mm Fully-covered self-expanding metal biliary stent is deployed with noted waisting in the distal third portion and immediate ejection of dark bile. Recommendations:    -Keep NPO x 2hrs, then start CLD. .    -NO aspirin for 10 days   -Consider for d/c home tomorrow if tolerates advancement of diet  -Check pending cytology; results will not be back until Wednesday or Thursday; if negative, she will need liver biopsy of hepatic mets to make diagnosis  -You will receive a letter about the biopsy results in about 10 days. You may be asked to call your doctor's office for the results.       Discharge Disposition:  To room following recovery in PACU    Elinor Mathis MD

## 2021-10-25 NOTE — PROGRESS NOTES
Bedside shift change report given to Ciarra Hwang  (oncoming nurse) by Viry Boyce (offgoing nurse). Report included the following information SBAR, Kardex, ED Summary, Intake/Output, MAR and Recent Results     Pt remained stable throughout shift. Pt complained of pain to abdomen and prn medication was given per MAR. Pt was NPO @midnight for ERCP in the am. Safety rounding done.

## 2021-10-25 NOTE — PROGRESS NOTES
Hospitalist Progress Note    NAME: Brayden Medrano   :  1955   MRN:  [id number]     I reviewed pertinent labs and imaging, and discussed /agreed on the plan of care with Dr. Jh Jenkins. Assessment / Plan:  Obstructive Jaundice r/t new findings of Pancreatic Mass with Multiple Liver Lesions   Likely Pancreatic Cancer with Liver Metastases   Elevated lipase  -Patient was transferred from OSH for specialist evaluation   -Recent unintentional weight loss reported. Went to ED when she noticed her eyes began turning yellow. Does not have any abdominal pain currently. -CT abd/pelvis        1. Large ill-defined hypoenhancing mass in the uncinate process with mass effect  and displacement of SMA/SMV, most concerned for primary malignancy. 2. Numerous ill-defined hypodense masses in the liver, most compatible with  metastasis. 3. Marked intrahepatic and extrahepatic biliary dilatation, likely mass effect  by tumor compression in the region of ampulla. 4. No lymphadenopathy. 5. Small left renal cyst. Small pelvic free fluid. -MRCP        1. 3 cm x 2.1 cm x 5 cm obstructing pancreatic head/uncinate mass consistent  with neoplasm, specifically adenocarcinoma. 2. Innumerable hepatic metastatic lesions.  -Total Bilirubin 9.4 today - follow   -LFTs with slight trend down today - follow   -Continue IVF   -Appreciate GI input - Plan for ERCP today   -Appreciate Oncology input    -Patient plans on following up with her 's oncologist in [facility] to DC after ERCP, likely in AM if stable    Protein Malnutrition   -BMI 17  -Continue ensure   -Consult to Dietician     less than 18.5 Underweight / Body mass index is 17.9 kg/m². Estimated discharge date:   Barriers: ERCP     Code status: Full  Prophylaxis: SCD's  Recommended Disposition: Home w/Family     Subjective:     Chief Complaint / Reason for Physician Visit  Patient seen at bedside, she has no complaints.  Her  was also at the bedside. Discussed plan of care, patient and  verbalized understanding/aggrement. Discussed with RN events overnight. Review of Systems:  Symptom Y/N Comments  Symptom Y/N Comments   Fever/Chills n   Chest Pain n    Poor Appetite y   Edema n    Cough n   Abdominal Pain n    Sputum n   Joint Pain n    SOB/SWENSON n   Pruritis/Rash n    Nausea/vomit n   Tolerating PT/OT     Diarrhea n   Tolerating Diet y    Constipation n   Other       Could NOT obtain due to:      Objective:     VITALS:   Last 24hrs VS reviewed since prior progress note. Most recent are:  Patient Vitals for the past 24 hrs:   Temp Pulse Resp BP SpO2   10/25/21 0742 98.1 °F (36.7 °C) 99 16 (!) 149/81 98 %   10/24/21 2336 98.5 °F (36.9 °C) 93 16 (!) 146/71 100 %   10/24/21 2013 98.4 °F (36.9 °C) 82 16 133/68 97 %   10/24/21 1525 97.8 °F (36.6 °C) 78 18 (!) 141/80 99 %     No intake or output data in the 24 hours ending 10/25/21 4701     I had a face to face encounter and independently examined this patient on 10/25/2021, as outlined below:  PHYSICAL EXAM:  General: WD, WN. Alert, cooperative, frail female in no acute distress    EENT:  EOMI. icteric sclerae. MMM  Resp:  CTA bilaterally, no wheezing or rales. No accessory muscle use  CV:  Regular  rhythm,  No edema  GI:  Soft, Non distended, Non tender. +Bowel sounds  Neurologic:  Alert and oriented X 3, normal speech,   Psych:   Good insight. Not anxious nor agitated  Skin:  No rashes.   No jaundice    Reviewed most current lab test results and cultures  YES  Reviewed most current radiology test results   YES  Review and summation of old records today    NO  Reviewed patient's current orders and MAR    YES  PMH/SH reviewed - no change compared to H&P  ________________________________________________________________________  Care Plan discussed with:    Comments   Patient x    Family  x     RN x    Care Manager x    Consultant Multidiciplinary team rounds were held today with , nursing, pharmacist and clinical coordinator. Patient's plan of care was discussed; medications were reviewed and discharge planning was addressed. ________________________________________________________________________  Total NON critical care TIME:  25   Minutes    Total CRITICAL CARE TIME Spent:   Minutes non procedure based      Comments   >50% of visit spent in counseling and coordination of care x    ________________________________________________________________________  Asia Casas NP     Procedures: see electronic medical records for all procedures/Xrays and details which were not copied into this note but were reviewed prior to creation of Plan. LABS:  I reviewed today's most current labs and imaging studies.   Pertinent labs include:  Recent Labs     10/23/21  0342   WBC 10.6   HGB 10.7*   HCT 31.6*        Recent Labs     10/25/21  0330 10/24/21  0520 10/23/21  0342   * 135* 136   K 3.9 3.8 3.5    103 104   CO2 29 28 28   * 112* 111*   BUN 7 5* 2*   CREA 0.50* 0.46* 0.33*   CA 9.0 9.1 9.0   MG  --   --  1.8   ALB 2.0* 2.0* 1.9*   TBILI 9.4* 8.8* 8.6*   * 321* 352*   INR  --   --  1.2*       Signed: Asia Casas NP

## 2021-10-25 NOTE — PERIOP NOTES
Handoff Report from Operating Room to PACU    Report received from Roshan Daniel RN and Serina Griffin CRNA regarding Devin Hummel. Surgeon(s):  Chana Matta MD  And Procedure(s) (LRB):  ENDOSCOPIC RETROGRADE CHOLANGIOPANCREATOGRAPHY WITH STENT (N/A)  confirmed   with dressings discussed. Anesthesia type, drugs, patient history, complications, estimated blood loss, vital signs, intake and output, and last pain medication, lines and temperature were reviewed.

## 2021-10-25 NOTE — H&P
See prior Consultation Note. The patient was reexamined. No interval change in the last 3 days. Proceed with procedure(s) with GA as clinically indicated.

## 2021-10-25 NOTE — PERIOP NOTES
TRANSFER - OUT REPORT:    Verbal report given to Kartik Edwards RN(name) on Jaymie Jiménez  being transferred to 1115(unit) for routine post - op       Report consisted of patients Situation, Background, Assessment and   Recommendations(SBAR). Information from the following report(s) OR Summary, Procedure Summary, Intake/Output and MAR was reviewed with the receiving nurse. Opportunity for questions and clarification was provided.       Patient transported with:   [email or web address]

## 2021-10-25 NOTE — ANESTHESIA POSTPROCEDURE EVALUATION
Procedure(s):  ENDOSCOPIC RETROGRADE CHOLANGIOPANCREATOGRAPHY WITH STENT. general    Anesthesia Post Evaluation      Multimodal analgesia: multimodal analgesia used between 6 hours prior to anesthesia start to PACU discharge  Patient location during evaluation: PACU  Level of consciousness: sleepy but conscious  Pain management: adequate  Airway patency: patent  Anesthetic complications: no  Cardiovascular status: acceptable  Respiratory status: acceptable  Hydration status: acceptable  Comments: +Post-Anesthesia Evaluation and Assessment    Patient: Orion Coles MRN: [id number]  SSN: [id number]   YOB: 1955  Age: 73 y.o. Sex: female      Cardiovascular Function/Vital Signs    /70   Pulse 84   Temp 37.1 °C (98.7 °F)   Resp 18   Ht 5' 6\" (1.676 m)   Wt 50.3 kg (110 lb 14.3 oz)   SpO2 97%   BMI 17.90 kg/m²     Patient is status post Procedure(s):  ENDOSCOPIC RETROGRADE CHOLANGIOPANCREATOGRAPHY WITH STENT. Nausea/Vomiting: Controlled. Postoperative hydration reviewed and adequate. Pain:  Pain Scale 1: Numeric (0 - 10) (10/25/21 1200)  Pain Intensity 1: 0 (10/25/21 1200)   Managed. Neurological Status:   Neuro (WDL): Exceptions to WDL (10/25/21 1143)   At baseline. Mental Status and Level of Consciousness: Arousable. Pulmonary Status:   O2 Device: None (Room air) (10/25/21 1205)   Adequate oxygenation and airway patent. Complications related to anesthesia: None    Post-anesthesia assessment completed. No concerns. Signed By: Olga Braswell MD    10/25/2021  Post anesthesia nausea and vomiting:  controlled  Final Post Anesthesia Temperature Assessment:  Normothermia (36.0-37.5 degrees C)      INITIAL Post-op Vital signs:   Vitals Value Taken Time   /70 10/25/21 1215   Temp 37.1 °C (98.7 °F) 10/25/21 1143   Pulse 89 10/25/21 1219   Resp 19 10/25/21 1219   SpO2 99 % 10/25/21 1219   Vitals shown include unvalidated device data.

## 2021-10-25 NOTE — ANESTHESIA PREPROCEDURE EVALUATION
Relevant Problems   No relevant active problems       Anesthetic History   No history of anesthetic complications            Review of Systems / Medical History  Patient summary reviewed, nursing notes reviewed and pertinent labs reviewed    Pulmonary  Within defined limits                 Neuro/Psych   Within defined limits           Cardiovascular  Within defined limits                Exercise tolerance: >4 METS     GI/Hepatic/Renal  Within defined limits              Endo/Other        Anemia     Other Findings   Comments: Obstructive Jaundice r/t new findings of Pancreatic Mass with Multiple Liver Lesions     Emaciated           Physical Exam    Airway  Mallampati: II  TM Distance: 4 - 6 cm  Neck ROM: normal range of motion   Mouth opening: Normal     Cardiovascular  Regular rate and rhythm,  S1 and S2 normal,  no murmur, click, rub, or gallop  Rhythm: regular  Rate: normal         Dental  No notable dental hx       Pulmonary  Breath sounds clear to auscultation               Abdominal  GI exam deferred       Other Findings            Anesthetic Plan    ASA: 3  Anesthesia type: general    Monitoring Plan: BIS      Induction: Intravenous  Anesthetic plan and risks discussed with: Patient

## 2021-10-26 NOTE — PROGRESS NOTES
Transition of Care Plan:     RUR: 11%  Disposition: home with spouse   Follow up appointments: PCP, Oncology   DME needed:n/a  Transportation at Discharge: Spouse able to transport her home, at bedside  [place] or means to access home: yes  IM Medicare Letter: delivered no 10/26/21  Is patient a BCPI-A Bundle:        n/a              If yes, was Bundle Letter given?:     Caregiver Contact:Spouse; Licha Leggett; [phone]  Son: Leeanne Dee: [phone]  DTR: Toshia Sims: [phone]  Discharge Caregiver contacted prior to discharge? yes    Chart reviewed. CM aware of discharge order.  Met with pt and family at bedside to finalize and review d/c plan. Spouse and son at bedside today and will transport pt home. Medicare pt has received, reviewed, and signed 2nd  letter informing them of their right to appeal the discharge. Signed copy has been placed on pt bedside chart. Pt was provided with copy of letter to keep. No [facility] needs identified. PCP appointment previously scheduled for 11/2 @ 10AM. CM made contact with Dr. Alvin Miller office (oncology) to request appointment. Per , they will contact pt directly to schedule after discharge. No further CM needs identified at this time. Pt shared she is eager to return home. Pt is ready for d/c from a CM standpoint. Assigned RN informed. Care Management Interventions  PCP Verified by CM: Yes  Palliative Care Criteria Met (RRAT>21 & CHF Dx)?: No  Mode of Transport at Discharge:  Other (see comment) (spouse)  Transition of Care Consult (CM Consult): Discharge Planning  MyChart Signup: No  Discharge Durable Medical Equipment: No  Physical Therapy Consult: No  Occupational Therapy Consult: No  Speech Therapy Consult: No  Support Systems: Child(barbara), Spouse/Significant Other, Other Family Member(s)  Confirm Follow Up Transport: Family  The Patient and/or Patient Representative was Provided with a Choice of Provider and Agrees with the Discharge Plan?: Yes  Freedom of Choice List was Provided with Basic Dialogue that Supports the Patient's Individualized Plan of Care/Goals, Treatment Preferences and Shares the Quality Data Associated with the Providers?: No   Resource Information Provided?: No  Discharge Location  Discharge Placement: Home with family assistance    Tyrel Rivera, [address], [facility]  [phone]

## 2021-10-26 NOTE — PROGRESS NOTES
GI PROGRESS NOTE  Kennedy Cam, [place]  [phone] NP in-hospital cell phone M-F until 4:30  After 5pm or on weekends, please call  for physician on call    NAME:Jaymie Powell :1955 XHQ:[id number]   ATTG: Dr Milton Gooden PCP:None Date/Time:  10/26/2021 9:46 AM   Primary GI: Dr Josefina Conley covering - pt from [place], [place]  Reason for following: pancreatic mass    Assessment:   Pancreatic mass with painless obstructive jaundice  S/p ERCP with stent placement and brushings  Seen finding on CT/MRI : pancreatic head mass and innumerable large and small hepatic mets concerning for adenocarcinoma   - TB down to 4.5 today from 9.4  - Transaminases down to ,   ERCP 10/25/21 : -Dilated common bile duct to 16mm with distal tapering to 2cm stricture zone concerning for malignancy. A sphincterotomy is completed without bleeding,  Brushings are obtained of the stricture zone. A 12mm balloon cannot be pulled through the stricture zone. A 10mm x 60mm Fully-covered self-expanding metal biliary stent is deployed with noted waisting in the distal third portion and immediate ejection of dark bile. Tolerated diet today of solid food. Plan:   · No aspirin for 10 days - resume 21  · Follow cytology on brushings - does not need to stay for these  · If cytology neg, will need percutaneous liver bx for diagnosis  · Will need to follow up with oncology in home town - pt's spouse has an oncologist that they would like to see    Provided my card for further questions or concerns. Pt to call us if they have not heard about cytology results on Thursday/Friday. Okay to discharged from GI standpoint. Will sign off. Plan discussed with Dr Josefina Conley. Subjective:   Discussed with RN events overnight. Feeling well today. No pain.      Complaint Y/N Description   Abdominal Pain n    Hematemesis n    Hematochezia n    Melena n    Constipation n    Diarrhea n    Dyspepsia n    Dysphagia n    Jaundiced n Nausea/vomiting n      Review of Systems:  Symptom Y/N Comments  Symptom Y/N Comments   Fever/Chills    Chest Pain     Cough    Headaches     Sputum    Joint Pain     SOB/SWENSON    Pruritis/Rash     Tolerating Diet y Gi lite  Other       Could NOT obtain due to:      Objective:   VITALS:   Last 24hrs VS reviewed since prior progress note. Most recent are:  Visit Vitals  /76 (BP 1 Location: Left upper arm, BP Patient Position: At rest)   Pulse 98   Temp 99.4 °F (37.4 °C)   Resp 18   Ht 5' 6\" (1.676 m)   Wt 50.3 kg (110 lb 14.3 oz)   SpO2 97%   BMI 17.90 kg/m²       Intake/Output Summary (Last 24 hours) at 10/26/2021 0946  Last data filed at 10/25/2021 1130  Gross per 24 hour   Intake 600 ml   Output    Net 600 ml     PHYSICAL EXAM:  General: WD, WN. Alert, cooperative, no acute distress    HEENT: NC, Atraumatic. Anicteric sclerae. Lungs:  CTA Bilaterally. No Wheezing/Rhonchi/Rales. Heart:  Regular  rhythm,  No murmur, No Rubs, No Gallops  Abdomen: Soft, Non distended, Non tender.  +Bowel sounds, no HSM  Extremities: No c/c/e  Neurologic:  Alert and oriented X 3. No acute neurological distress   Psych:   Good insight. Not anxious nor agitated. Lab and Radiology Data Reviewed: (see below)    Medications Reviewed: (see below)  PMH/SH reviewed - no change compared to H&P  ________________________________________________________________________  Total time spent with patient: 20 minutes ________________________________________________________________________  Care Plan discussed with:  Patient y   Family  y - spouse and son   RN y              Consultant:  susy Reese, hospitalist     Dennis Snow NP     Procedures: see electronic medical records for all procedures/Xrays and details which were not copied into this note but were reviewed prior to creation of Plan. LABS:  No results for input(s): WBC, HGB, HCT, PLT, HGBEXT, HCTEXT, PLTEXT in the last 72 hours.   Recent Labs     10/26/21  0403 10/25/21  0330 10/24/21  0520   * 135* 135*   K 3.7 3.9 3.8    102 103   CO2 28 29 28   BUN 6 7 5*   CREA 0.38* 0.50* 0.46*   GLU 98 115* 112*   CA 9.0 9.0 9.1     Recent Labs     10/26/21  0406 10/25/21  0330 10/24/21  0520   * 434* 432*   TP 6.3* 6.4 6.1*   ALB 1.9* 2.0* 2.0*   GLOB 4.4* 4.4* 4.1*     No results for input(s): INR, PTP, APTT, INREXT in the last 72 hours. No results for input(s): FE, TIBC, PSAT, FERR in the last 72 hours. No results found for: FOL, RBCF  No results for input(s): PH, PCO2, PO2 in the last 72 hours. No results for input(s): CPK, CKMB in the last 72 hours.     No lab exists for component: TROPONINI  Lab Results   Component Value Date/Time    Color DARK YELLOW 10/21/2021 01:30 AM    Appearance CLEAR 10/21/2021 01:30 AM    Specific gravity >1.030 (H) 10/21/2021 01:30 AM    pH (UA) 5.5 10/21/2021 01:30 AM    Protein 30 (A) 10/21/2021 01:30 AM    Glucose 250 (A) 10/21/2021 01:30 AM    Ketone TRACE (A) 10/21/2021 01:30 AM    Bilirubin LARGE (A) 10/21/2021 01:30 AM    Urobilinogen 1.0 10/21/2021 01:30 AM    Nitrites Positive (A) 10/21/2021 01:30 AM    Leukocyte Esterase SMALL (A) 10/21/2021 01:30 AM    Epithelial cells 3+ 10/21/2021 01:30 AM    Bacteria 3+ (A) 10/21/2021 01:30 AM    WBC 0 to 3 10/21/2021 01:30 AM    RBC Negative 10/21/2021 01:30 AM       MEDICATIONS:  Current Facility-Administered Medications   Medication Dose Route Frequency    sodium chloride (NS) flush 5-40 mL  5-40 mL IntraVENous Q8H    sodium chloride (NS) flush 5-40 mL  5-40 mL IntraVENous PRN    alcohol 62% (NOZIN) nasal  1 Ampule  1 Ampule Topical Q12H    famotidine (PEPCID) tablet 20 mg  20 mg Oral DAILY    polyethylene glycol (MIRALAX) packet 17 g  17 g Oral DAILY    acetaminophen (TYLENOL) tablet 650 mg  650 mg Oral Q6H PRN    Or    acetaminophen (TYLENOL) suppository 650 mg  650 mg Rectal Q6H PRN    ondansetron (ZOFRAN ODT) tablet 4 mg  4 mg Oral Q8H PRN    Or    ondansetron (ZOFRAN) injection 4 mg  4 mg IntraVENous Q6H PRN    senna (SENOKOT) tablet 8.6 mg  1 Tablet Oral DAILY    bisacodyL (DULCOLAX) tablet 5 mg  5 mg Oral DAILY PRN    morphine injection 2 mg  2 mg IntraVENous Q4H PRN    oxyCODONE IR (ROXICODONE) tablet 5 mg  5 mg Oral Q4H PRN

## 2021-10-26 NOTE — PROGRESS NOTES
I have reviewed discharge instructions with the patient and spouse. The patient and spouse verbalized understanding. Discharge medications reviewed with patient and spouse and appropriate educational materials and side effects teaching were provided. Follow-up appointments reviewed. Opportunity for questions and clarification was provided. Venous access removed without difficulty. Patient's belongings gathered and sent with patient. Patient is ready for discharge.      Maximilian Isaacs RN

## 2021-10-26 NOTE — PROGRESS NOTES
Bedside and Verbal shift change report given to Uriel Katz (oncoming nurse) by Corbin Dorantes (offgoing nurse). Report included the following information SBAR, Kardex, Procedure Summary, Intake/Output, MAR and Recent Results. Patient complained of pain X2 with pain medication given when requested. Diet advanced to full liquid for she tolerated clear liquids without difficulty.

## 2021-10-26 NOTE — PROGRESS NOTES
Pharmacist Discharge Medication Reconciliation    Significant PMH: History reviewed. No pertinent past medical history. Encounter Diagnoses:   Encounter Diagnoses   Name Primary? Obstructive jaundice     Pancreatic mass Yes     Allergies: Patient has no known allergies. Discharge Medications:   Current Discharge Medication List        START taking these medications    Details   senna (SENOKOT) 8.6 mg tablet Take 1 Tablet by mouth daily for 30 days. Qty: 30 Tablet, Refills: 0  Start date: 10/26/2021, End date: 11/25/2021      oxyCODONE IR (ROXICODONE) 5 mg immediate release tablet Take 1 Tablet by mouth every six (6) hours as needed for Pain for up to 3 days. Max Daily Amount: 20 mg.  Qty: 12 Tablet, Refills: 0  Start date: 10/26/2021, End date: 10/29/2021    Associated Diagnoses: Pancreatic mass           CONTINUE these medications which have NOT CHANGED    Details   famotidine (PEPCID) 20 mg tablet Take 1 Tablet by mouth daily. Qty: 30 Tablet, Refills: 0      polyethylene glycol (Miralax) 17 gram/dose powder Take 17 g by mouth daily. 1 tablespoon with 8 oz of water daily  Qty: 289 g, Refills: 0             The patient's chart, MAR and AVS were reviewed by Ankur Hayes [facility].     Discharging Provider: Yuridia Cifuentes MD      Thank you,     Ankur Hayes, [facility]

## 2021-10-26 NOTE — DISCHARGE INSTRUCTIONS
Patient Education        Biliary Stent Placement: What to Expect at Home  Your Recovery  Endoscopic retrograde cholangiopancreatogram (ERCP)  If your biliary duct placement was done with ERCP, you probably will stay at the hospital or clinic for 1 to 2 hours. This will allow the numbing medicine to wear off. You will be able to go home after your doctor or a nurse checks to make sure that you are not having any problems. If you stay in the hospital overnight, you may go home the next day. You may have a sore throat for a day or two after the procedure. Percutaneous transhepatic cholangiography (PTC)  If the placement was done with PTC, your doctor may have you lie on your right side for at least 6 hours before you can go home. This is to lower the risk of bleeding from the injection site. If you stay in the hospital overnight, you may go home the next day. You may have some pain where the needle entered your skin (the puncture site). You may also have pain in your shoulder. This is called referred pain. It's caused by pain traveling along a nerve that goes to the liver. The referred pain usually lasts less than 12 hours. You may have a small amount of bleeding from the puncture site. You will need to take it easy at home for 1 or 2 days after the PTC. You will probably be able to go back to work and most of your usual activities after that. This care sheet gives you a general idea about how long it will take for you to recover. But each person recovers at a different pace. Follow the steps below to feel better as quickly as possible. How can you care for yourself at home? Activity    · Rest as much as you need to after you go home.     · You should be able to go back to your usual activities within a day or two, depending on how physically hard those activities are. Diet    · Follow your doctor's directions for eating after the procedure.     · Drink plenty of fluids (unless your doctor tells you not to). Medicines    · Your doctor will tell you if and when you can restart your medicines. He or she will also give you instructions about taking any new medicines.     · If you take aspirin or some other blood thinner, be sure to talk to your doctor. He or she will tell you if and when to start taking this medicine again. Make sure that you understand exactly what your doctor wants you to do. Follow-up care is a key part of your treatment and safety. Be sure to make and go to all appointments, and call your doctor if you are having problems. It's also a good idea to know your test results and keep a list of the medicines you take. When should you call for help? Call 911 anytime you think you may need emergency care. For example, call if:    · You passed out (lost consciousness). Call your doctor now or seek immediate medical care if:    · You have pain that does not get better after you take pain medicine.     · You have signs of an infection, such as:  ? Increased pain, swelling, warmth, or redness. ? Red streaks leading from the area. ? Pus draining from the area. ? A fever.     · You are sick to your stomach or cannot hold down fluids.     · Bright red blood has soaked through the bandage. Watch closely for changes in your health, and be sure to contact your doctor if you have any problems. Where can you learn more? Go to http://www.gray.com/  Enter O716 in the search box to learn more about \"Biliary Stent Placement: What to Expect at Home. \"  Current as of: February 10, 2021               Content Version: 13.0  © 2006-2021 Healthwise[employer]. Care instructions adapted under license by [employer] (which disclaims liability or warranty for this information).  If you have questions about a medical condition or this instruction, always ask your healthcare professional. [place] [address] any warranty or liability for your use of this information. Patient Education           HOSPITALIST DISCHARGE INSTRUCTIONS    NAME: Debbie Kenny   :  1955   MRN:  [id number]     Date/Time:  10/26/2021 9:32 AM    ADMIT DATE: 10/22/2021   DISCHARGE DATE: 10/26/2021     Attending Physician: Austin Morel NP    DISCHARGE DIAGNOSIS:  Obstructive Jaundice r/t new findings of Pancreatic Mass with Multiple Liver Lesions   Likely Pancreatic Cancer with Liver Metastases   Elevated lipase  Protein Malnutrition       Medications: Per above medication reconciliation. Pain Management: per above medications    Recommended diet: Regular Diet and Encourage nutritional supplements like Ensure     Recommended activity: Activity as tolerated    Wound care: None    Indwelling devices:  None    Supplemental Oxygen: None    Required Lab work: none    Glucose management:  None    Code status: Full    Outside physician follow up: Follow-up Information     Follow up With Specialties Details Why Contact Info      In 3 days Make appt with your oncologist      None    None (395) Patient stated that they have no PCP            [facility] to avoid frequent ED visits. [facility] can treat; pains, sprains, cuts, wounds, high fevers, upper respiratory infections and much more. There medical team is equipped with all the tools necessary to provide advanced medical care in the comfort of you home, workplace, or location of need. The medical team consists of doctors, nurse practitioners, and EMTs. [facility] is available 7 days per week 9 am to 9 pm.   Request care by calling [phone] or by going online at [employer]      Information obtained by :  I understand that if any problems occur once I am at home I am to contact my physician. I understand and acknowledge receipt of the instructions indicated above. Physician's or R.N.'s Signature                                                                  Date/Time                                                                                                                                              Patient or Repres       Pancreatic Cancer: Care Instructions  Overview     Pancreatic cancer occurs when abnormal cells grow out of control in the pancreas. Your pancreas is in your belly, behind your stomach. It makes juices that help your body digest food. It also makes insulin, which helps control your blood sugar level. You may have more than one treatment at the same time. For example, you may have surgery to take out part or all of your pancreas. The surgery may include removing your spleen, common bile duct, part of your stomach, or part of your small intestine (duodenum). Other treatments may include radiation therapy, chemotherapy, targeted therapy, or immunotherapy. You may need to take medicines to help you digest food and control your blood sugar. If you have pain, your doctor will give you medicine or other treatment to help you be more comfortable. Follow-up care is a key part of your treatment and safety. Be sure to make and go to all appointments, and call your doctor if you are having problems. It's also a good idea to know your test results and keep a list of the medicines you take. How can you care for yourself at home? · Take your medicines exactly as prescribed. Call your doctor if you think you are having a problem with your medicine. · Eat healthy food. If you do not feel like eating, try to eat food that has protein and extra calories to keep up your strength and prevent weight loss. Drink liquid meal replacements for extra calories and protein. Try to eat your main meal early in the day. · Get some physical activity every day, but do not get too tired. Keep doing the things you enjoy as your energy allows.   · Take steps to manage your stress, such as learning relaxation techniques. To also help reduce stress, get enough sleep, eat a healthy diet, and take time to do things you enjoy. · Think about joining a support group. Or discuss your concerns with your doctor or a counselor. · If you are vomiting or have diarrhea:  ? Drink plenty of fluids to prevent dehydration. Choose water and other clear liquids. If you have kidney, heart, or liver disease and have to limit fluids, talk with your doctor before you increase the amount of fluids you drink. ? When you are able to eat, try clear soups, mild foods, and liquids until all symptoms are gone for 12 to 48 hours. Other good choices include dry toast, crackers, cooked cereal, and gelatin dessert, such as Jell-O.  · If you have not already done so, prepare a list of advance directives. Advance directives are instructions to your doctor and family members about what kind of care you want if you become unable to speak or express yourself. When should you call for help? Call 911 anytime you think you may need emergency care. For example, call if:    · You passed out (lost consciousness). Call your doctor now or seek immediate medical care if:    · You have a fever.     · You have abnormal bleeding.     · You have new or worse pain.     · You think you have an infection.     · You have new symptoms, such as a cough, belly pain, vomiting, diarrhea, or a rash.     · You have signs of a blood clot, such as:  ? Pain in your calf, back of the knee, thigh, or groin. ? Redness and swelling in your leg or groin. Watch closely for changes in your health, and be sure to contact your doctor if:    · You are much more tired than usual.     · You have swollen glands in your armpits, groin, or neck.     · You do not get better as expected. Where can you learn more?   Go to http://www.gray.com/  Enter D037 in the search box to learn more about \"Pancreatic Cancer: Care Instructions. \"  Current as of: December 17, 2020               Content Version: 13.0  © 4087-8968 [facility]. Care instructions adapted under license by [employer] (which disclaims liability or warranty for this information). If you have questions about a medical condition or this instruction, always ask your healthcare professional. [place] [address] any warranty or liability for your use of this information.

## 2021-10-26 NOTE — DISCHARGE SUMMARY
Hospitalist Discharge Summary     Patient ID:  Riaz Looney  [id number]  73 y.o.  1955  10/22/2021    PCP on record: None    Admit date: 10/22/2021  Discharge date and time: 10/26/2021    DISCHARGE DIAGNOSIS:    Obstructive Jaundice r/t new findings of Pancreatic Mass with Multiple Liver Lesions   Likely Pancreatic Cancer with Liver Metastases   Elevated lipase  Protein Malnutrition     CONSULTATIONS:  IP CONSULT TO GASTROENTEROLOGY  IP CONSULT TO ONCOLOGY    Excerpted HPI from H&P of Janeth Brown MD:  Saige Carvajal is a 73 y.o.  female who presents with the above CC. Pt presented with abd pain started 3 weeks ago, no reported vomiting, fever and chills. Pt reported that she lost about 8 lbs. Denies etoh. Pt presented to ED on 10/20 for abd pain, KUB showed constipation and pt sent home. Today pt came back as she noticed her eyes becoming yellowish.     No results found. We were asked to admit for work up and evaluation of the above problems.      No past medical history on file.     ______________________________________________________________________  DISCHARGE SUMMARY/HOSPITAL COURSE:  for full details see H&P, daily progress notes, labs, consult notes. Obstructive Jaundice r/t new findings of Pancreatic Mass with Multiple Liver Lesions   Likely Pancreatic Cancer with Liver Metastases   Elevated lipase  -Patient was transferred from OSH for specialist evaluation   -Recent unintentional weight loss reported. Went to ED when she noticed her eyes began turning yellow. Does not have any abdominal pain currently. -CT abd/pelvis        1. Large ill-defined hypoenhancing mass in the uncinate process with mass effect  and displacement of SMA/SMV, most concerned for primary malignancy. 2. Numerous ill-defined hypodense masses in the liver, most compatible with  metastasis.        3. Marked intrahepatic and extrahepatic biliary dilatation, likely mass effect  by tumor compression in the region of ampulla. 4. No lymphadenopathy. 5. Small left renal cyst. Small pelvic free fluid. -MRCP        1. 3 cm x 2.1 cm x 5 cm obstructing pancreatic head/uncinate mass consistent  with neoplasm, specifically adenocarcinoma. 2. Innumerable hepatic metastatic lesions.  -Total Bilirubin now trending down   -LFTs trending down   -Appreciate GI input - S/p ERCP 10/25 with stent placement and biopsies taken   -Appreciate Oncology input    -Patient plans on following up with her 's oncologist in [address] [place] [address] Drive   -Provided prescription for oxycodone for pain at [place]  Protein Malnutrition   -BMI 17  -Encourage Ensure      Patient seen at bedside. Patient's plan of care has been reviewed with them. Patient and/or family have verbally conveyed their understanding and agreement of the patient's signs, symptoms, diagnosis, treatment and prognosis and additionally agree to follow up as recommended or return to [facility] should their condition change prior to follow-up. Discharge instructions have also been provided to the patient with some educational information regarding their diagnosis as well a list of reasons why they would want to return to the office prior to their follow-up appointment should their condition change.    _______________________________________________________________________  Patient seen and examined by me on discharge day. Pertinent Findings:  Gen:    Not in distress  Chest: Clear lungs  CVS:   Regular rhythm. No edema  Abd:  Soft, not distended, not tender  Neuro:  Alert and oriented x3   _______________________________________________________________________  DISCHARGE MEDICATIONS:   Current Discharge Medication List      START taking these medications    Details   senna (SENOKOT) 8.6 mg tablet Take 1 Tablet by mouth daily for 30 days.   Qty: 30 Tablet, Refills: 0  Start date: 10/26/2021, End date: 11/25/2021      oxyCODONE IR (ROXICODONE) 5 mg immediate release tablet Take 1 Tablet by mouth every six (6) hours as needed for Pain for up to 3 days. Max Daily Amount: 20 mg.  Qty: 12 Tablet, Refills: 0  Start date: 10/26/2021, End date: 10/29/2021    Associated Diagnoses: Pancreatic mass         CONTINUE these medications which have NOT CHANGED    Details   famotidine (PEPCID) 20 mg tablet Take 1 Tablet by mouth daily. Qty: 30 Tablet, Refills: 0      polyethylene glycol (Miralax) 17 gram/dose powder Take 17 g by mouth daily. 1 tablespoon with 8 oz of water daily  Qty: 289 g, Refills: 0               Patient Follow Up Instructions:    Activity: Activity as tolerated  Diet: Regular Diet      Follow-up Information     Follow up With Specialties Details Why Contact Info      In 3 days Make appt with your oncologist      None    None (395) Patient stated that they have no PCP          ________________________________________________________________    Risk of deterioration: Low    Condition at Discharge:  Stable  __________________________________________________________________    Disposition  Home with family, no needs    ____________________________________________________________________    Code Status: Full Code  ___________________________________________________________________      Total time in minutes spent coordinating this discharge (includes going over instructions, follow-up, prescriptions, and preparing report for sign off to her PCP) :  >30 minutes    Signed:  Asia Casas NP

## 2021-10-26 NOTE — PROGRESS NOTES
Physician Progress Note      Tamar Guy  CSN #:                  [id number]  :                       1955  ADMIT DATE:       10/22/2021 5:43 PM  DISCH DATE:  RESPONDING  PROVIDER #:        Maximo VALDEZ NP          QUERY TEXT:    Pt admitted with pancreatic cancer  and has malnutrition documented. Please further specify type of malnutrition with documentation in the medical record. The medical record reflects the following:  Risk Factors: mets  Clinical Indicators:  10/24 nutritional consult  Most recent weight from 10/20 was 111lb indicating 16lb (13%) wt loss over 6 months, severe for the time frame. Ensure enlive already ordered TID, pt agreeable to continue. Will continue monitoring. Encourage intake of meals and supplements. ?  Malnutrition Assessment:  Malnutrition Status: Moderate malnutrition  Context:  Chronic illness  Findings of the 6 clinical characteristics of malnutrition:  Energy Intake:  No significant decrease in energy intake  Weight Loss:  7.00 - Greater than 10% over 6 months  Body Fat Loss:  1 - Mild body fat loss,  Muscle Mass Loss:  1 - Mild muscle mass loss,  Fluid Accumulation:  No significant fluid accumulation,   Strength:  Not performed    Treatment: nutritional consult, NPO at present    Thank you,  Tamar Hansen RN, CDI, [facility], [address] [place] [address]  Certified Clinical   [phone]      ASPEN Criteria:  https://aspenjournals. onlinelibrary. garza. com/doi/full/10.1177/7248359144478321  Options provided:  -- Moderate Malnutrition  -- Moderate  Protein calorie malnutrition  -- Mild Malnutrition  -- Other - I will add my own diagnosis  -- Disagree - Not applicable / Not valid  -- Disagree - Clinically unable to determine / Unknown  -- Refer to Clinical Documentation Reviewer    PROVIDER RESPONSE TEXT:    This patient has moderate protein calorie malnutrition.     Query created by: Marcelo Aguayo on 10/25/2021 2:35 PM      Electronically signed by:  Alphonse Villalpando NP 10/26/2021 10:28 AM

## 2021-10-26 NOTE — PROGRESS NOTES
Hematology-Oncology Progress Note      [facility]  1955  [id number]  10/26/2021      Subjective:     Pain controlled. No new symptoms. Eating breakfast   Allergies: Patient has no known allergies.   Current Facility-Administered Medications   Medication Dose Route Frequency Provider Last Rate Last Admin    sodium chloride (NS) flush 5-40 mL  5-40 mL IntraVENous Q8H Elodia Casillas MD   10 mL at 10/25/21 2109    sodium chloride (NS) flush 5-40 mL  5-40 mL IntraVENous PRN Elodia Casillas MD        alcohol 62% (NOZIN) nasal  1 Ampule  1 Ampule Topical Q12H Elodia Casillas MD   1 Ampule at 10/25/21 2107    famotidine (PEPCID) tablet 20 mg  20 mg Oral DAILY Angelito Trent MD   20 mg at 10/24/21 3138    polyethylene glycol (MIRALAX) packet 17 g  17 g Oral DAILY Bren Shah MD   17 g at 10/24/21 3803    acetaminophen (TYLENOL) tablet 650 mg  650 mg Oral Q6H PRN Bren Shah MD        Or   [facility] acetaminophen (TYLENOL) suppository 650 mg  650 mg Rectal Q6H PRN Richard Trent MD        ondansetron (ZOFRAN ODT) tablet 4 mg  4 mg Oral Q8H PRN Richard Trent MD        Or    ondansetron [facility]) injection 4 mg  4 mg IntraVENous Q6H PRN Richard Trent MD        senna (SENOKOT) tablet 8.6 mg  1 Tablet Oral DAILY Alexandr Trent MD   8.6 mg at 10/24/21 6003    bisacodyL (DULCOLAX) tablet 5 mg  5 mg Oral DAILY PRN Bren Shah MD        morphine injection 2 mg  2 mg IntraVENous Q4H PRN Bren Shah MD   2 mg at 10/26/21 0245    oxyCODONE IR (ROXICODONE) tablet 5 mg  5 mg Oral Q4H PRN Bren Shah MD   5 mg at 10/25/21 4211     Objective:     Patient Vitals for the past 24 hrs:   BP Temp Pulse Resp SpO2   10/26/21 0800     97 %   10/26/21 0758 137/76 99.4 °F (37.4 °C) 98  97 %   10/25/21 2300 (!) 160/83 98.3 °F (36.8 °C) 78 18 100 %   10/25/21 2008 (!) 151/81 98 °F (36.7 °C) 77 18 100 %   10/25/21 1510 (!) 145/85 97.4 °F (36.3 °C) 83 18 99 %   10/25/21 1258 139/78 98.6 °F (37 °C) 85 18 98 %   10/25/21 1215 131/70 98.5 °F (36.9 °C) 84 18 97 %   10/25/21 1205 132/72  84 18 100 %   10/25/21 1200 136/76  87 18 100 %   10/25/21 1155 138/74  91 18 100 %   10/25/21 1150 135/75  94 19 100 %   10/25/21 1145 137/75  (!) 103 22 100 %   10/25/21 1143 (!) 141/77 98.7 °F (37.1 °C) 100 18 100 %   10/25/21 0955 (!) 153/86 99.4 °F (37.4 °C) 81 18 98 %       Gen: NAD  Resp no distress  Ext: No c/c/e    Available labs reviewed:  Labs:    Recent Results (from the past 24 hour(s))   METABOLIC PANEL, COMPREHENSIVE    Collection Time: 10/26/21  4:06 AM   Result Value Ref Range    Sodium 134 (L) 136 - 145 mmol/L    Potassium 3.7 3.5 - 5.1 mmol/L    Chloride 100 97 - 108 mmol/L    CO2 28 21 - 32 mmol/L    Anion gap 6 5 - 15 mmol/L    Glucose 98 65 - 100 mg/dL    BUN 6 6 - 20 MG/DL    Creatinine 0.38 (L) 0.55 - 1.02 MG/DL    BUN/Creatinine ratio 16 12 - 20      GFR est AA >60 >60 ml/min/1.73m2    GFR est non-AA >60 >60 ml/min/1.73m2    Calcium 9.0 8.5 - 10.1 MG/DL    Bilirubin, total 4.5 (H) 0.2 - 1.0 MG/DL    ALT (SGPT) 248 (H) 12 - 78 U/L    AST (SGOT) 117 (H) 15 - 37 U/L    Alk. phosphatase 417 (H) 45 - 117 U/L    Protein, total 6.3 (L) 6.4 - 8.2 g/dL    Albumin 1.9 (L) 3.5 - 5.0 g/dL    Globulin 4.4 (H) 2.0 - 4.0 g/dL    A-G Ratio 0.4 (L) 1.1 - 2.2           Assessment and Plan     74 y/o previously healthy woman admitted in transfer from [place] for pancreatic lesion and innumerable liver lesions. 1. Pancreatic mass: Has lesions to her liver as well. Will need f/u closer to home. Prefer to receive f/u cancer care with her 's oncologist at [facility] in [place]. Family is getting that set up. Had stent and brushings done yesterday. Path pending, bili down some today, ok for discharge from our standpoint for her to f/u with oncologist in [place]. 2. Pain: controlled.

## 2021-11-02 NOTE — PATIENT INSTRUCTIONS
Abdominal Pain: Care Instructions Your Care Instructions Abdominal pain has many possible causes. Some aren't serious and get better on their own in a few days. Others need more testing and treatment. If your pain continues or gets worse, you need to be rechecked and may need more tests to find out what is wrong. You may need surgery to correct the problem. Don't ignore new symptoms, such as fever, nausea and vomiting, urination problems, pain that gets worse, and dizziness. These may be signs of a more serious problem. Your doctor may have recommended a follow-up visit in the next 8 to 12 hours. If you are not getting better, you may need more tests or treatment. The doctor has checked you carefully, but problems can develop later. If you notice any problems or new symptoms, get medical treatment right away. Follow-up care is a key part of your treatment and safety. Be sure to make and go to all appointments, and call your doctor if you are having problems. It's also a good idea to know your test results and keep a list of the medicines you take. How can you care for yourself at home? · Rest until you feel better. · To prevent dehydration, drink plenty of fluids. Choose water and other clear liquids until you feel better. If you have kidney, heart, or liver disease and have to limit fluids, talk with your doctor before you increase the amount of fluids you drink. · If your stomach is upset, eat mild foods, such as rice, dry toast or crackers, bananas, and applesauce. Try eating several small meals instead of two or three large ones. · Wait until 48 hours after all symptoms have gone away before you have spicy foods, alcohol, and drinks that contain caffeine. · Do not eat foods that are high in fat. · Avoid anti-inflammatory medicines such as aspirin, ibuprofen (Advil, Motrin), and naproxen (Aleve). These can cause stomach upset.  Talk to your doctor if you take daily aspirin for another health problem. When should you call for help? Call 911 anytime you think you may need emergency care. For example, call if: 
  · You passed out (lost consciousness).  
  · You pass maroon or very bloody stools.  
  · You vomit blood or what looks like coffee grounds.  
  · You have new, severe belly pain. Call your doctor now or seek immediate medical care if: 
  · Your pain gets worse, especially if it becomes focused in one area of your belly.  
  · You have a new or higher fever.  
  · Your stools are black and look like tar, or they have streaks of blood.  
  · You have unexpected vaginal bleeding.  
  · You have symptoms of a urinary tract infection. These may include: 
? Pain when you urinate. ? Urinating more often than usual. 
? Blood in your urine.  
  · You are dizzy or lightheaded, or you feel like you may faint. Watch closely for changes in your health, and be sure to contact your doctor if: 
  · You are not getting better after 1 day (24 hours). Where can you learn more? Go to http://www.gray.com/ Enter Q280 in the search box to learn more about \"Abdominal Pain: Care Instructions. \" Current as of: July 1, 2021               Content Version: 13.0 © 5499-2603 [employer]. Care instructions adapted under license by [employer] (which disclaims liability or warranty for this information). If you have questions about a medical condition or this instruction, always ask your healthcare professional. [place] [address] any warranty or liability for your use of this information.

## 2021-11-02 NOTE — PROGRESS NOTES
Rolando Chicas, 73 y.o.,  female presents as a new patient, accompanied by her . Chief Complaint Patient presents with  New Patient  Abdominal Pain  
  c/o abdominal pain along with some discomfort (worse at night) [facility] Follow Up  
  obstructive Jaundice r/t new findings of Pancreatic Mass with Multiple Liver Lesions SUBJECTIVE 1. Abdominal pain Patient describes abdominal pain is generalized abdominal discomfort 8/10, gets worse at night and relieves after taking oxycodone. Onset of abd pain ~ 3-4 weeks ago, no vomiting, fever or chills. Pt reported that she lost about 8 lbs. Pt presented to ED on 10/20 for abd pain, KUB showed constipation and pt sent home. Hospitalized in [facility] on 10/22/2021 for 4 days. CT abd/pelvis and showed pancreatic mass with multiple liver lesions. Diagnosed with obstructive jaundice as well. Underwent endoscopic retrograde cholangiopancreatography with stent on 10/25/2021. Reports her condition improved after the procedure, jaundice resolved, pain subsided. Oncology consult scheduled for tomorrow, 11/3/2021. GI consult scheduled for 11/4/2021. No significant past medical history Patient is a former smoker, quit smoking 1.5 months ago. Denies etoh. Family history is remarkable for colon cancer in patient's father Review of Systems Gastrointestinal: Positive for abdominal pain. OBJECTIVE Physical Exam:  
 
Visit Vitals /72 (BP 1 Location: Left arm, BP Patient Position: Sitting, BP Cuff Size: Large adult) Pulse 73 Temp 98.2 °F (36.8 °C) (Temporal) Resp 16 Ht 5' 6\" (1.676 m) Wt 109 lb (49.4 kg) SpO2 98% BMI 17.59 kg/m² General: alert, well-appearing, thin, in no apparent distress or pain Head: atraumatic. Non-tender maxillary and frontal sinuses Eyes: Lids with no discharge, no matting, conjunctivae slightly icteric, non injected, PERLLA Ears: pinna non-tender, external auditory canal patent, TM intact Mouth/throat: teeth, gums, palate normal appearing, moist oral mucosa, tonsils non enlarged, pharynx non erythematous and no lesion Neck: supple, no JVD , no carotid bruit, no adenopathy palpated CVS: normal rate, regular rhythm, distinct S1 and S2 Lungs: Breathing not labored, good chest excursion, clear to ausculation bilaterally Abdomen: soft, non-tender Extremities: no edema Skin: warm, no lesions, rashes noted Psych: alert and oriented x3, mood, insight, speech and affect normal 
 
 
ASSESSMENT/PLAN Diagnoses and all orders for this visit: 
 
1. Intermittent abdominal pain Patient advised to take oxycodone 5 mg every 6 hours as needed for pain. GI consult scheduled for 11/4/2021. 
2. Pancreatic mass This condition will be managed by the specialist. Oncology consult scheduled for tomorrow, 11/3/2021.   
3. Patient underweight Weight loss most likely related to pancreatic cancer. Nutrient supplementation recommended. Follow-up and Dispositions · Return in about 1 month (around 12/2/2021) for welcome to medicare / routine care .

## 2021-11-02 NOTE — PROGRESS NOTES
1. \"Have you been to the ER, urgent care clinic since your last visit? Hospitalized since your last visit? \" Yes 10- to 10- for Obstructive Jaundice r/t new findings of Pancreatic Mass with Multiple Liver Lesions 2. \"Have you seen or consulted any other health care providers outside of the [address] [place] [address] since your last visit? \" No  
 
3. For patients aged 39-70: Has the patient had a colonoscopy? Yes, but HM not satisfied. Rooming MA/LPN to request most recent results 2009 per patient does not remember name (appointment with [facility] on 11- with Yanick Roland PA-C) If the patient is female: 4. For patients aged 41-77: Has the patient had a mammogram within the past 2 years? Yes, but HM not satisfied. Rooming MA/LPN to request most recent results June of 2021 5. For patients aged 21-65: Has the patient had a pap smear? Yes, but HM not satisfied. Rooming MA/LPN to request most recent results April of 2021 Dr. Pamela Sanchez

## 2021-11-03 PROBLEM — F11.99 OPIOID USE, UNSPECIFIED WITH UNSPECIFIED OPIOID-INDUCED DISORDER (HCC): Status: ACTIVE | Noted: 2021-01-01

## 2021-11-03 NOTE — PROGRESS NOTES
Hematology/Oncology Consultation Note      Date: 11/3/2021    Name: Brayden Medrano  : 1955        Larisa Sanchez PA-C         Subjective:     Chief complaint: Pancreatic mass, liver lesions    History of Present Illness:   Ms. Luis Rouse is a most pleasant 73y.o. year old female who was seen for consultation of Pancreatic mass, liver lesions. The patient was accompanied by her  during this visit. The patient has a unremarkable past medical history. -- Patient presented to ED with abdominal pain started 3 weeks ago, associated with weight loss. She denied vomiting, fever and chills. Denies EtOH abuse. She has smoked for about 15 years, just quit about a month ago. -- 10/22- Admitted for obstructive jaundice r/t new findings of pancreatic mass with multiple liver lesions, suspected pancreatic cancer with liver metastases and protein malnutrition. -- 10/20/2021 CT abd/pelvis reported large ill-defined hypo-enhancing mass in the uncinate process with mass effect and displacement of SMA/SMV, most concerned for primary malignancy.  + Numerous ill-defined hypodense masses in the liver, most compatible with metastasis. +  Marked intrahepatic and extrahepatic biliary dilatation, likely mass effect by tumor compression in the region of ampulla.  + No lymphadenopathy. -- 10/22/2021 MRCP reported 3 cm x 2.1 cm x 5 cm obstructing pancreatic head/uncinate mass consistent with neoplasm, specifically adenocarcinoma.  +  Innumerable hepatic metastatic lesions.  --10/25/2021 S/p ERCP with stent placement and biopsies taken. Bilirubin levels trend down. -- 10/26/2021 Common Bile Duct Brushing, ThinPrep reported rare atypical cells present in a background of benign ductal epithelium and bile. Rare atypical cells are present, suspicious for but not diagnostic of malignancy. These could also represent reactive/reparative ductal epithelial cells. -- Pending Cytology report at this time.    -- She requested to refill pain medications. She reported right arm swelling for last few days. The patient otherwise has no other complaints. Denied fever, chills, night sweat, skin lumps or bumps, acute bleeding or bruising issues. Denied headache, acute vision change, dizziness, chest pain, worsen shortness of breath, palpitation, productive cough, nausea, vomiting, altered bowel habits, dysuria, worsen bone pain or back pain, new focal numbness or weakness. Family History: Father had colon cancer; Mother had kidney cancer      Past Medical History, Family History, and Social History:    No past medical history on file. Past Surgical History:   Procedure Laterality Date    HX OOPHORECTOMY      1 removed    [facility] [place] COLLECTION SPECIMEN BRUSHING/WASHING  10/25/2021         [facility] STENT PLACEMENT BILIARY/PANCREATIC DUCT  10/25/2021         [place] [facility] W/SPHINCTEROTOMY/PAPILLOTOMY  10/25/2021          Social History     Socioeconomic History    Marital status:      Spouse name: Not on file    Number of children: Not on file    Years of education: Not on file    Highest education level: Not on file   Occupational History    Not on file   Tobacco Use    Smoking status: Former Smoker     Packs/day: 0.50     Years: 50.00     Pack years: 25.00    Smokeless tobacco: Never Used    Tobacco comment: quit 1 month ago   Vaping Use    Vaping Use: Never used   Substance and Sexual Activity    Alcohol use: Never    Drug use: Never    Sexual activity: Yes     Partners: Male   Other Topics Concern    Not on file   Social History Narrative    Not on file     Social Determinants of Health     Financial Resource Strain:     Difficulty of Paying Living Expenses: Not on file   Food Insecurity:     Worried About Running Out of Food in the Last Year: Not on file    Cara of Food in the Last Year: Not on file   Transportation Needs:     Lack of Transportation (Medical):  Not on file    Lack of Transportation (Non-Medical): Not on file   Physical Activity:     Days of Exercise per Week: Not on file    Minutes of Exercise per Session: Not on file   Stress:     Feeling of Stress : Not on file   Social Connections:     Frequency of Communication with Friends and Family: Not on file    Frequency of Social Gatherings with Friends and Family: Not on file    Attends Islam Services: Not on file    Active Member of [address] [place] [address] or Organizations: Not on file    Attends Club or Organization Meetings: Not on file    Marital Status: Not on file   Intimate Partner Violence:     Fear of Current or Ex-Partner: Not on file    Emotionally Abused: Not on file    Physically Abused: Not on file    Sexually Abused: Not on file   Housing Stability:     Unable to Pay for Housing in the Last Year: Not on file    Number of [place] in the Last Year: Not on file    Unstable Housing in the Last Year: Not on file     Family History   Problem Relation Age of Onset    Other Mother         renal failure     Colon Cancer Father      Current Outpatient Medications   Medication Sig Dispense Refill    multivitamin (ONE A DAY) tablet Take 1 Tablet by mouth daily.  IODINE by Does Not Apply route.  oxyCODONE IR (ROXICODONE) 5 mg immediate release tablet Take 1 Tablet by mouth every eight (8) hours as needed for Pain for up to 14 days. Max Daily Amount: 15 mg. 42 Tablet 0    senna (SENOKOT) 8.6 mg tablet Take 1 Tablet by mouth daily for 30 days. 30 Tablet 0    famotidine (PEPCID) 20 mg tablet Take 1 Tablet by mouth daily. 30 Tablet 0    polyethylene glycol (Miralax) 17 gram/dose powder Take 17 g by mouth daily. 1 tablespoon with 8 oz of water daily 289 g 0       Review of Systems   Constitutional: Positive for weight loss. Negative for chills, diaphoresis, fever and malaise/fatigue. Respiratory: Negative for cough, hemoptysis, shortness of breath and wheezing.     Cardiovascular: Negative for chest pain, palpitations and leg swelling. Gastrointestinal: Positive for abdominal pain. Negative for diarrhea, heartburn, nausea and vomiting. Genitourinary: Negative for dysuria, frequency, hematuria and urgency. Musculoskeletal: Negative for joint pain and myalgias. Skin: Negative for itching and rash. Neurological: Negative for dizziness, seizures, weakness and headaches. Psychiatric/Behavioral: Negative for depression. The patient does not have insomnia. Objective:     Visit Vitals  /70   Pulse 91   Temp 97.3 °F (36.3 °C) (Temporal)   Resp 16   Ht 5' 6\" (1.676 m)   Wt 49.4 kg (108 lb 12.8 oz)   SpO2 99%   BMI 17.56 kg/m²       ECOG Performance Status (grade): 0-1  0 - able to carry on all pre-disease activity w/out restriction  1 - restricted but able to carry out light work  2 - ambulatory and can self- care but unable to carry out work  3 - bed or chair >50% of waking hours  4 - completely disable, total care, confined to bed or chair    Physical Exam  Constitutional:       Appearance: Normal appearance. HENT:      Head: Normocephalic and atraumatic. Eyes:      Pupils: Pupils are equal, round, and reactive to light. Cardiovascular:      Rate and Rhythm: Normal rate and regular rhythm. Heart sounds: Normal heart sounds. Pulmonary:      Effort: Pulmonary effort is normal.      Breath sounds: Normal breath sounds. Abdominal:      General: Bowel sounds are normal.      Palpations: Abdomen is soft. Tenderness: There is no abdominal tenderness. There is no guarding. Musculoskeletal:         General: Swelling (RUE) present. Normal range of motion. Cervical back: Neck supple. Right lower leg: No edema. Left lower leg: No edema. Skin:     General: Skin is warm. Neurological:      General: No focal deficit present. Mental Status: She is alert and oriented to person, place, and time. Mental status is at baseline.           Diagnostics:      No results found for this or any previous visit (from the past 96 hour(s)). Imaging:  No results found for this or any previous visit. Results for orders placed during the hospital encounter of 10/22/21    XR ERCP / ERCB COMBINED    Narrative  INDICATION: surgery    EXAM: XR ERCP / ERCB COMBINED    FINDINGS: Fluoroscopic imaging utilized during procedure. Impression  Fluoroscopic imaging during procedure. REPORT PROVIDED FOR COMPLIANCE ONLY AT NO CHARGE. Results for orders placed during the hospital encounter of 10/20/21    CT ABD PELV W CONT    Narrative  CT of abdomen and pelvis with contrast    INDICATION: Upper abdominal pain, scleral icterus, weight loss    COMPARISON: None. TECHNIQUE: 5 mm helical scan to the abdomen and pelvis is obtained  from the  diaphragm to the symphysis pubis after uneventful nonionic IV contrast  administration. All CT scans at this facility performed using dose optimization techniques as  appreciated to a performed exam, to include automated exposure control,  adjustment of the mA and or KU according to patient size (including appropriate  matching for site specific examination), or use of iterative reconstruction  technique. FINDINGS:    Lung Bases: Clear. Liver: There are multiple ill-defined hypodense masses/nodules with mild rim  enhancement scattered throughout, the largest measures 2.7 x 3.1 x 2.9 cm in  segment one lateral to the IVC. Gallbladder: Elongated and distended gallbladder noted. Biliary System: There is moderate to significant intrahepatic ductal dilatation  and marked common bile duct dilatation, measuring up to 1.6 cm. Marked  pancreatic ductal dilatation also demonstrated. Abrupt caliber change at very  distal common bile duct and proximal pancreatic duct above the level of ampulla. Spleen: Normal.  Pancreas: There is a large ill-defined hypodense mass identified in the uncinate  process, roughly measures 2.1 x 3.0 x 5.0 cm.  There is moderate mass effect with  lateral displacement SMA/SMV and partial encasement of SMV. Bowel: The small and large bowel are nondilated. Adrenal Glands: Normal.  Kidneys: 1 cm cortical cyst in the lateral midpole of right kidney. Lower genitourinary system: The bladder is poorly distended. The uterus is  lobulated. There is 3.3 x 3.4 cm heterogeneous enhancing mass exophytically seen  in anterior uterus. Peritoneum/Retroperitoneum: No adenopathy. Vasculature: Moderate atherosclerotic aortic disease. Other: Small free fluid in the pelvic floor. .    CT OSSEOUS STRUCTURES:    Mild spondylosis. Impression  1. Large ill-defined hypoenhancing mass in the uncinate process with mass effect  and displacement of SMA/SMV, most concerned for primary malignancy. 2. Numerous ill-defined hypodense masses in the liver, most compatible with  metastasis. 3. Marked intrahepatic and extrahepatic biliary dilatation, likely mass effect  by tumor compression in the region of ampulla. 4. No lymphadenopathy. 5. Small left renal cyst. Small pelvic free fluid. Thank you for this referral.        Pathology          Assessment:                                        1. Pancreatic mass    2. Obstructive jaundice    3. Normocytic anemia    4. Opioid use, unspecified with unspecified opioid-induced disorder        Plan:                                        # Pancreatic mass  # Multiple liver lesions  # Obstructive jaundice--   -- Patient presented to ED with abdominal pain started 3 weeks ago, associated with weight loss. She denied vomiting, fever and chills. Denies EtOH abuse. She has smoked for about 15 years, just quit about a month ago. -- 10/22-26/2021 Admitted for obstructive jaundice r/t new findings of pancreatic mass with multiple liver lesions, suspected pancreatic cancer with liver metastases and protein malnutrition.   -- 10/20/2021 CT abd/pelvis reported large ill-defined hypo-enhancing mass in the uncinate process with mass effect and displacement of SMA/SMV, most concerned for primary malignancy.  + Numerous ill-defined hypodense masses in the liver, most compatible with metastasis. +  Marked intrahepatic and extrahepatic biliary dilatation, likely mass effect by tumor compression in the region of ampulla.  + No lymphadenopathy. -- 10/22/2021 MRCP reported 3 cm x 2.1 cm x 5 cm obstructing pancreatic head/uncinate mass consistent with neoplasm, specifically adenocarcinoma.  +  Innumerable hepatic metastatic lesions.  --10/25/2021 S/p ERCP with stent placement and biopsies taken. Bilirubin levels trend down. -- 10/26/2021 Common Bile Duct Brushing, ThinPrep reported rare atypical cells present in a background of benign ductal epithelium and bile. Rare atypical cells are present, suspicious for but not diagnostic of malignancy. These could also represent reactive/reparative ductal epithelial cells. -- She requested to refill pain medications. She reported right arm swelling for last few days. Plan:  -- Pending Cytology report at this time. If non-diagnostic will get IR Liver biopsy  -- Staging PET scan   -- Labs: CBC, CMP, Iron profile, ferritin, B12/Folate, CA 19-9      Right UE swelling  -- Venous doppler to r/o DVT      Abdominal pain  -- Refill Oxycodone 5 mg Q8Hr PRN      Protein malnutrition. Weight loss  -- Nutritionist consult      -- We will see the patient back in about 1-2 weeks. Always sooner if required.         Orders Placed This Encounter    CANCER AG 19-9     Standing Status:   Future     Standing Expiration Date:   26/2/2214    METABOLIC PANEL, COMPREHENSIVE     Standing Status:   Future     Standing Expiration Date:   11/4/2022    IRON PROFILE     Standing Status:   Future     Standing Expiration Date:   11/4/2022    FERRITIN     Standing Status:   Future     Standing Expiration Date:   11/3/2022    VITAMIN B12 & FOLATE     Standing Status:   Future     Standing Expiration Date:   11/3/2022    CBC WITH AUTOMATED DIFF     Standing Status:   Future     Standing Expiration Date:   11/3/2022    multivitamin (ONE A DAY) tablet     Sig: Take 1 Tablet by mouth daily.  IODINE     Sig: by Does Not Apply route.  oxyCODONE IR (ROXICODONE) 5 mg immediate release tablet     Sig: Take 1 Tablet by mouth every eight (8) hours as needed for Pain for up to 14 days. Max Daily Amount: 15 mg. Dispense:  42 Tablet     Refill:  0           Ms. Ashley Garcia has a reminder for a \"due or due soon\" health maintenance. I have asked that she contact her primary care provider for follow-up on this health maintenance. All of patient's questions answered to their apparent satisfaction. They verbally show understanding and agreement with aforementioned plan. Mildred Mchugh MD  11/3/2021              Above mentioned total time spent for this encounter with more than 50% of the time spent in face-to-face counseling, discussing on diagnosis and management plan going forward, and co-ordination of care. Parts of this document has been produced using Dragon dictation system. Unrecognized errors in transcription may be present. Please do not hesitate to reach out for any questions or clarifications.         CC: Daniella Thacker PA-C

## 2021-11-03 NOTE — PROGRESS NOTES
Medical Nutrition Therapy Note    Nutritional Assessment:  IBW: Ideal body weight: 130 lb 11.7 oz (59.3 kg)   UBW: 125 lbs  Wt hx:   Wt Readings from Last 20 Encounters:   11/03/21 108 lb 12.8 oz (49.4 kg)   11/02/21 109 lb (49.4 kg)   10/24/21 110 lb 14.3 oz (50.3 kg)   10/20/21 111 lb (50.3 kg)   10/12/21 125 lb (56.7 kg)     Current Ht:   Ht Readings from Last 1 Encounters:   11/03/21 5' 6\" (1.676 m)       Current BMI: Estimated body mass index is 17.56 kg/m² as calculated from the following:    Height as of an earlier encounter on 11/3/21: 5' 6\" (1.676 m). Weight as of an earlier encounter on 11/3/21: 108 lb 12.8 oz (49.4 kg). Wt change: 12.9% weight loss over the last 3 week(s)    Estimated nutritional needs:   Calorie Range: 5933-0068  Formula: 30-35 kcal/kg  Protein Range: 75-85   Formula: 1.5-1.7 g/kg  Fluid Needs: 1 mL/kcal    Nutrition-impact symptoms: Weight loss    11/03/21: Pt denies any changes in dietary intake. Pt endorses wt loss over the last few weeks, but was declines any decrease in dietary intake over this time. Pt also denies diarrhea or constipation. Pt stated that she likes ensure poured over ice. Nutritional Diagnosis: unintentional weight loss related to catabolic state as evidenced by suspicion of metastic cancer and > 5% wt loss in < 1 month    Nutritional Interventions:   1. Pt agreed to trial Ensure supplements between meals to aid in increasing caloric intake  2. Spoke with provider and while BMs are regular as well as biopsy results pending to know origin of cancer, no pancreatic enzymes at this time    Nutritional Monitoring/Goals:   Pt's wt > 105 lbs during treatment    Initial consult per other: Medical Oncology MA    Cancer Dx: No matching staging information was found for the patient. Cancer Staging  No matching staging information was found for the patient. Treatment Plan: No flowsheet data found. +   Radiation Treatments     No radiation treatments to show. (Treatments may have been administered in another system.)          Lab Results   Component Value Date/Time    Sodium 134 (L) 10/26/2021 04:06 AM    Sodium 135 (L) 10/25/2021 03:30 AM    Sodium 135 (L) 10/24/2021 05:20 AM    Sodium 136 10/23/2021 03:42 AM    Sodium 139 10/22/2021 08:59 AM    Potassium 3.7 10/26/2021 04:06 AM    Chloride 100 10/26/2021 04:06 AM    Chloride 102 10/25/2021 03:30 AM    Chloride 103 10/24/2021 05:20 AM    Chloride 104 10/23/2021 03:42 AM    Chloride 107 10/22/2021 08:59 AM    CO2 28 10/26/2021 04:06 AM    CO2 29 10/25/2021 03:30 AM    CO2 28 10/24/2021 05:20 AM    CO2 28 10/23/2021 03:42 AM    CO2 27 10/22/2021 08:59 AM    Anion gap 6 10/26/2021 04:06 AM    Anion gap 4 (L) 10/25/2021 03:30 AM    Anion gap 4 (L) 10/24/2021 05:20 AM    Anion gap 4 (L) 10/23/2021 03:42 AM    Anion gap 5 10/22/2021 08:59 AM    Glucose 98 10/26/2021 04:06 AM    Glucose 115 (H) 10/25/2021 03:30 AM    Glucose 112 (H) 10/24/2021 05:20 AM    Glucose 111 (H) 10/23/2021 03:42 AM    Glucose 95 10/22/2021 08:59 AM    BUN 6 10/26/2021 04:06 AM    BUN 7 10/25/2021 03:30 AM    BUN 5 (L) 10/24/2021 05:20 AM    BUN 2 (L) 10/23/2021 03:42 AM    BUN 4 (L) 10/22/2021 08:59 AM    Creatinine 0.38 (L) 10/26/2021 04:06 AM    Creatinine 0.50 (L) 10/25/2021 03:30 AM    Creatinine 0.46 (L) 10/24/2021 05:20 AM    Creatinine 0.33 (L) 10/23/2021 03:42 AM    Creatinine 0.38 (L) 10/22/2021 08:59 AM    BUN/Creatinine ratio 16 10/26/2021 04:06 AM    BUN/Creatinine ratio 14 10/25/2021 03:30 AM    BUN/Creatinine ratio 11 (L) 10/24/2021 05:20 AM    BUN/Creatinine ratio 6 (L) 10/23/2021 03:42 AM    BUN/Creatinine ratio 11 (L) 10/22/2021 08:59 AM    GFR est AA >60 10/26/2021 04:06 AM    GFR est AA >60 10/25/2021 03:30 AM    GFR est AA >60 10/24/2021 05:20 AM    GFR est AA >60 10/23/2021 03:42 AM    GFR est AA >60 10/22/2021 08:59 AM    GFR est non-AA >60 10/26/2021 04:06 AM    GFR est non-AA >60 10/25/2021 03:30 AM    GFR est non-AA >60 10/24/2021 05:20 AM    GFR est non-AA >60 10/23/2021 03:42 AM    GFR est non-AA >60 10/22/2021 08:59 AM    Calcium 9.0 10/26/2021 04:06 AM    Calcium 9.0 10/25/2021 03:30 AM    Calcium 9.1 10/24/2021 05:20 AM    Calcium 9.0 10/23/2021 03:42 AM    Calcium 7.7 (L) 10/22/2021 08:59 AM         Current Outpatient Medications:     multivitamin (ONE A DAY) tablet, Take 1 Tablet by mouth daily. , Disp: , Rfl:     IODINE, by Does Not Apply route., Disp: , Rfl:     oxyCODONE IR (ROXICODONE) 5 mg immediate release tablet, Take 1 Tablet by mouth every eight (8) hours as needed for Pain for up to 14 days. Max Daily Amount: 15 mg., Disp: 42 Tablet, Rfl: 0    senna (SENOKOT) 8.6 mg tablet, Take 1 Tablet by mouth daily for 30 days. , Disp: 30 Tablet, Rfl: 0    famotidine (PEPCID) 20 mg tablet, Take 1 Tablet by mouth daily. , Disp: 30 Tablet, Rfl: 0    polyethylene glycol (Miralax) 17 gram/dose powder, Take 17 g by mouth daily.  1 tablespoon with 8 oz of water daily, Disp: 289 g, Rfl: 0    Diet/po intake: 3 meals per day (currently and at baseline)

## 2021-11-17 NOTE — TELEPHONE ENCOUNTER
Medical Nutrition Therapy Note    Nutritional Assessment:  IBW: Ideal body weight: 130 lb 11.7 oz (59.3 kg)   UBW: 125 lbs  Wt hx:   Wt Readings from Last 20 Encounters:   11/03/21 108 lb 12.8 oz (49.4 kg)   11/02/21 109 lb (49.4 kg)   10/24/21 110 lb 14.3 oz (50.3 kg)   10/20/21 111 lb (50.3 kg)   10/12/21 125 lb (56.7 kg)     Current Ht:   Ht Readings from Last 1 Encounters:   11/03/21 5' 6\" (1.676 m)       Current BMI: Estimated body mass index is 17.56 kg/m² as calculated from the following:    Height as of 11/3/21: 5' 6\" (1.676 m). Weight as of 11/3/21: 108 lb 12.8 oz (49.4 kg). Wt change: 12.9% weight loss over the last 3 week(s)    Estimated nutritional needs:   Calorie Range: 5068-1963  Formula: 30-35 kcal/kg  Protein Range: 75-85   Formula: 1.5-1.7 g/kg  Fluid Needs: 1 mL/kcal    Nutrition-impact symptoms: Weight loss    11/03/21: Pt denies any changes in dietary intake. Pt endorses wt loss over the last few weeks, but was declines any decrease in dietary intake over this time. Pt also denies diarrhea or constipation. Pt stated that she likes ensure poured over ice. 11/17/21: Spouse answered phone and stated pt's meal intake has remained the same since our initial visit, but reports pt has been drinking 2 ensure supplements each day. Spouse mentioned that pt's clothing isn't as loose now, as it was prior to our initial visit. Spouse states pt's BMs remain regular. Nutritional Diagnosis: unintentional weight loss related to catabolic state as evidenced by suspicion of metastic cancer and > 5% wt loss in < 1 month    Nutritional Interventions:   1. Encouraged pt to continue using oral nutritional supplements    Previously  1. Pt agreed to trial Ensure supplements between meals to aid in increasing caloric intake  2.  Spoke with provider and while BMs are regular as well as biopsy results pending to know origin of cancer, no pancreatic enzymes at this time    Nutritional Monitoring/Goals:   Pt's wt > 105 lbs during treatment -no new wt documented to assess    Initial consult per other: Medical Oncology MA    Cancer Dx: No matching staging information was found for the patient. Cancer Staging  No matching staging information was found for the patient. Treatment Plan: No flowsheet data found. +   Radiation Treatments     No radiation treatments to show.  (Treatments may have been administered in another system.)          Lab Results   Component Value Date/Time    Sodium 135 (L) 11/03/2021 12:17 PM    Sodium 134 (L) 10/26/2021 04:06 AM    Sodium 135 (L) 10/25/2021 03:30 AM    Sodium 135 (L) 10/24/2021 05:20 AM    Sodium 136 10/23/2021 03:42 AM    Potassium 4.1 11/03/2021 12:17 PM    Chloride 101 11/03/2021 12:17 PM    Chloride 100 10/26/2021 04:06 AM    Chloride 102 10/25/2021 03:30 AM    Chloride 103 10/24/2021 05:20 AM    Chloride 104 10/23/2021 03:42 AM    CO2 30 11/03/2021 12:17 PM    CO2 28 10/26/2021 04:06 AM    CO2 29 10/25/2021 03:30 AM    CO2 28 10/24/2021 05:20 AM    CO2 28 10/23/2021 03:42 AM    Anion gap 4 11/03/2021 12:17 PM    Anion gap 6 10/26/2021 04:06 AM    Anion gap 4 (L) 10/25/2021 03:30 AM    Anion gap 4 (L) 10/24/2021 05:20 AM    Anion gap 4 (L) 10/23/2021 03:42 AM    Glucose 114 (H) 11/03/2021 12:17 PM    Glucose 98 10/26/2021 04:06 AM    Glucose 115 (H) 10/25/2021 03:30 AM    Glucose 112 (H) 10/24/2021 05:20 AM    Glucose 111 (H) 10/23/2021 03:42 AM    BUN 9 11/03/2021 12:17 PM    BUN 6 10/26/2021 04:06 AM    BUN 7 10/25/2021 03:30 AM    BUN 5 (L) 10/24/2021 05:20 AM    BUN 2 (L) 10/23/2021 03:42 AM    Creatinine 0.52 (L) 11/03/2021 12:17 PM    Creatinine 0.38 (L) 10/26/2021 04:06 AM    Creatinine 0.50 (L) 10/25/2021 03:30 AM    Creatinine 0.46 (L) 10/24/2021 05:20 AM    Creatinine 0.33 (L) 10/23/2021 03:42 AM    BUN/Creatinine ratio 17 11/03/2021 12:17 PM    BUN/Creatinine ratio 16 10/26/2021 04:06 AM    BUN/Creatinine ratio 14 10/25/2021 03:30 AM    BUN/Creatinine ratio 11 (L) 10/24/2021 05:20 AM    BUN/Creatinine ratio 6 (L) 10/23/2021 03:42 AM    GFR est AA >60 11/03/2021 12:17 PM    GFR est AA >60 10/26/2021 04:06 AM    GFR est AA >60 10/25/2021 03:30 AM    GFR est AA >60 10/24/2021 05:20 AM    GFR est AA >60 10/23/2021 03:42 AM    GFR est non-AA >60 11/03/2021 12:17 PM    GFR est non-AA >60 10/26/2021 04:06 AM    GFR est non-AA >60 10/25/2021 03:30 AM    GFR est non-AA >60 10/24/2021 05:20 AM    GFR est non-AA >60 10/23/2021 03:42 AM    Calcium 9.0 11/03/2021 12:17 PM    Calcium 9.0 10/26/2021 04:06 AM    Calcium 9.0 10/25/2021 03:30 AM    Calcium 9.1 10/24/2021 05:20 AM    Calcium 9.0 10/23/2021 03:42 AM         Current Outpatient Medications:     multivitamin (ONE A DAY) tablet, Take 1 Tablet by mouth daily. , Disp: , Rfl:     IODINE, by Does Not Apply route., Disp: , Rfl:     oxyCODONE IR (ROXICODONE) 5 mg immediate release tablet, Take 1 Tablet by mouth every eight (8) hours as needed for Pain for up to 14 days. Max Daily Amount: 15 mg., Disp: 42 Tablet, Rfl: 0    senna (SENOKOT) 8.6 mg tablet, Take 1 Tablet by mouth daily for 30 days. , Disp: 30 Tablet, Rfl: 0    famotidine (PEPCID) 20 mg tablet, Take 1 Tablet by mouth daily. , Disp: 30 Tablet, Rfl: 0    polyethylene glycol (Miralax) 17 gram/dose powder, Take 17 g by mouth daily.  1 tablespoon with 8 oz of water daily, Disp: 289 g, Rfl: 0    Diet/po intake: 3 meals per day (currently and at baseline)

## 2021-11-24 NOTE — ROUTINE PROCESS
1010: Cath holding summary     Patient escorted to cath holding from waiting area ambulatory, alert and oriented x 4, voicing no complaints. Changed into gown and placed on monitor. NPO since MN. Lab results, med rec and H&P reviewed on chart. PIV x 1 inserted without difficulty. Family to bedside. 1600: AVS Discharge instructions reviewed with patient and copy given to patient. All questions answered. Patient verbalized understanding to all discharge instructions. PIV removed. Procedural site within normal limits. No hematoma or bleeding noted from procedural and PIV site. No pain noted at discharge. Patient discharged with support person in stable condition. Escorted out to vehicle for transport home.

## 2021-11-24 NOTE — DISCHARGE INSTRUCTIONS
[facility] [address] Implanted Port Discharge Instructions      General Instructions:   A port is like an implanted IV. They are usually ordered for patients who will be getting chemotherapy, but can also be used as an IV for long term antibiotics, large amounts of fluids, and/or blood products. Your blood can be drawn from your port for labs also. Those patients who do not have good veins find the ports convenient as they can get the IV they need with one stick. The port can be used long term, and the care is easy. The device is under the skin, and once the skin heals, care is minimal. All that is required is the nurse who accesses the port will need to flush it with heparinized saline after each use. Ports are usually placed in the chest wall, usually on the right side. But they can be place in the arms and in the abdomen. Home Care Instructions: If your port is in your arm, do not allow blood pressure or other IVs to be place in that arm. Do not allow bra straps or any clothing to rub the skin over the port. Do not bathe or swim until the skin has healed and if the port is accessed. Once it is healed, and when the port is not accessed, it is okay to bathe and swim. Restrict yourself to light activity for the first 5 days after getting the port put in, after that, resume normal activity slowly. You may resume your normal diet and medications. Follow-Up Instructions: Please see your oncologist, or whatever physician ordered the port as he/she has requested of you. Call If: You should call your Physician and/or the Radiology Nurse if you notice redness, pus, swelling, or pain from the area of your incision. Call if you should develop a fever. The nurses who access your port will know to call your doctor if the port does not seem to be working properly. You need to tell the nurses who use the port if you should have any pain or swelling at the site during an infusion.     To Reach Us: [phone]    Patient Signature:  Date: 11/24/2021  Discharging Nurse: Sal[facility] Larson [facility]  [facility]  [address] [facility]  [phone] Office  [phone] Fax    BIOPSY DISCHARGE INSTRUCTIONS    General Instructions:     A biopsy is the removal of a small piece of tissue for microscopic examination or testing. Healthy tissue can be obtained for the purpose of tissue-type matching for transplants. Unhealthy tissues are more commonly biopsied to diagnose disease. Lung Biopsy:     A needle lung biopsy is performed when there is a mass discovered in the lung area. The most serious risk is infection, bleeding, and pneumothorax (a collapsed lung). Signs of pneumothorax include shortness of breath, rapid heart rate, and blueness of the skin. If any of these occur, call 911. Liver Biopsy: This test helps detect cancer, infections, and the cause of an enlargement of the liver or elevated liver enzymes. It also helps to diagnose a number of liver diseases. The pain associated with the procedure may be felt in the shoulder. The risks include internal bleeding, pneumothorax, and injury to the surrounding organs. Renal Biopsy: This procedure is sometimes done to evaluate a transplanted kidney. It is also used to evaluate unexplained decrease in kidney function, blood, or protein in the urine. You may see bright red blood in the urine the first 24 hours after the test. If the bleeding lasts longer, you need to call your doctor. There is a risk of infection and bleeding into the muscle, which may cause soreness. Spinal Biopsy: This test is sometimes done in conjunction with other procedures. Your back will be sore, as we are taking a small sample of bone, which is slightly more difficult to sample than tissue.    General Biopsy:     A mass can grow in any area of the body, and we are taking a specimen as ordered by your doctor. The risks are the same. They include bleeding, pain, and infection. Home Care Instructions: You may resume your regular diet and medication regimen. Do not drink alcohol, drive, or make any important legal decisions in the next 24 hours. Do not lift anything heavier than a gallon of milk until the soreness goes away. You may use over the counter acetaminophen or ibuprofen for the soreness. You may apply an ice pack to the affected area for 20-30 minutes at time for the first 24 hours. After that, you may apply a heat pack. Call If: You should call your Physician and/or the Radiology Nurse if you have any questions or concerns about the biopsy site. Call if you should have increased pain, fever, redness, drainage, or bleeding more than a small spot on the bandage. Follow-Up Instructions: Please see your ordering doctor as he/she has requested. To Reach Us:  [phone]      Patient Signature:  Date: 11/24/2021  Discharging Nurse: Chirag hSeets from Nurse    PATIENT INSTRUCTIONS:    After general anesthesia or intravenous sedation, for 24 hours or while taking prescription Narcotics:  · Limit your activities  · Do not drive and operate hazardous machinery  · Do not make important personal or business decisions  · Do  not drink alcoholic beverages  · If you have not urinated within 8 hours after discharge, please contact your surgeon on call. Report the following to your surgeon:  · Excessive pain, swelling, redness or odor of or around the surgical area  · Temperature over 100.5  · Nausea and vomiting lasting longer than 4 hours or if unable to take medications  · Any signs of decreased circulation or nerve impairment to extremity: change in color, persistent  numbness, tingling, coldness or increase pain  · Any questions    What to do at Home:  Recommended activity: Activity as tolerated and no driving for today.     If you experience any of the following symptoms bleeding at site or pain unrelieved by over the counter pain medications, please follow up with Dr. Janine Izquierdo. *  Please give a list of your current medications to your Primary Care Provider. *  Please update this list whenever your medications are discontinued, doses are      changed, or new medications (including over-the-counter products) are added. *  Please carry medication information at all times in case of emergency situations. These are general instructions for a healthy lifestyle:    No smoking/ No tobacco products/ Avoid exposure to second hand smoke  Surgeon General's Warning:  Quitting smoking now greatly reduces serious risk to your health. Obesity, smoking, and sedentary lifestyle greatly increases your risk for illness    A healthy diet, regular physical exercise & weight monitoring are important for maintaining a healthy lifestyle    You may be retaining fluid if you have a history of heart failure or if you experience any of the following symptoms:  Weight gain of 3 pounds or more overnight or 5 pounds in a week, increased swelling in our hands or feet or shortness of breath while lying flat in bed. Please call your doctor as soon as you notice any of these symptoms; do not wait until your next office visit. The discharge information has been reviewed with the patient. The patient verbalized understanding. Discharge medications reviewed with the patient and appropriate educational materials and side effects teaching were provided.   ___________________________________________________________________________________________________________________________________

## 2021-11-24 NOTE — PROGRESS NOTES
TRANSFER - OUT REPORT:    Verbal report given to Aleksandra RN(name) on Capri Dewitt  being transferred to [facility](unit) for routine post - op       Report consisted of patients Situation, Background, Assessment and   Recommendations(SBAR). Information from the following report(s) SBAR, Kardex, Procedure Summary and MAR was reviewed with the receiving nurse. Lines:   Venous Access Device Wink CT Lot [id number] 11/24/21 Upper chest (subclavicular area, right (Active)       Peripheral IV 11/24/21 Left Antecubital (Active)        Opportunity for questions and clarification was provided.       Patient transported with:   Registered Nurse

## 2021-11-24 NOTE — H&P
History and Physical    Patient: Delfino Rosenthal           Sex: female       DOA: 11/24/2021  YOB: 1955      Age:  73 y.o.     LOS:  LOS: 0 days        HPI:     Delfino Rosenthal is a 73 y.o. female who has imaging concerning for pancreatic mass with liver lesions here for a mediport placement and liver mass biopsy with moderate sedation. Labs show leukocytosis with WBC 19 today. Biliary stent placed 10/25/21    History reviewed. No pertinent past medical history. Past Surgical History:   Procedure Laterality Date    HX OOPHORECTOMY      1 removed    TN ERCP DX COLLECTION SPECIMEN BRUSHING/WASHING  10/25/2021         TN ERCP STENT PLACEMENT BILIARY/PANCREATIC DUCT  10/25/2021         TN ERCP W/SPHINCTEROTOMY/PAPILLOTOMY  10/25/2021            Family History   Problem Relation Age of Onset    Other Mother         renal failure     Colon Cancer Father        Social History     Socioeconomic History    Marital status:    Tobacco Use    Smoking status: Former Smoker     Packs/day: 0.50     Years: 50.00     Pack years: 25.00    Smokeless tobacco: Never Used    Tobacco comment: quit 1 month ago   Vaping Use    Vaping Use: Never used   Substance and Sexual Activity    Alcohol use: Never    Drug use: Never    Sexual activity: Yes     Partners: Male       Prior to Admission medications    Medication Sig Start Date End Date Taking? Authorizing Provider   oxyCODONE IR (Roxicodone) 5 mg immediate release tablet Take 1 Tablet by mouth every eight (8) hours as needed for Pain for up to 14 days. Max Daily Amount: 15 mg. 11/19/21 12/3/21 Yes Alida Dow MD   multivitamin (ONE A DAY) tablet Take 1 Tablet by mouth daily. Yes Provider, Historical   senna (SENOKOT) 8.6 mg tablet Take 1 Tablet by mouth daily for 30 days. 10/26/21 11/25/21 Yes Ruthy Reed NP   polyethylene glycol (Miralax) 17 gram/dose powder Take 17 g by mouth daily.  1 tablespoon with 8 oz of water daily 10/12/21  Yes Les Alfonso MD   famotidine (PEPCID) 20 mg tablet Take 1 Tablet by mouth daily. 10/12/21   Les Alfonso MD       No Known Allergies    Physical Exam:      Visit Vitals  Ht 5' 6\" (1.676 m)   Wt 50.3 kg (111 lb)   BMI 17.92 kg/m²     Physical Exam:  Mallampati 2 ASA 3  General: A&O x 4, NAD  Heart: RRR  Lungs: Normal work of breathing on room air    Labs Reviewed: All lab results for the last 24 hours reviewed.     Assessment/Plan     The patient is an appropriate candidate to undergo the planned procedure and sedation    AMADOR Leavitt

## 2021-11-24 NOTE — PROCEDURES
Interventional Radiology Brief Post Procedure Note     Procedure Performed: Mediport placement under fluoroscopic guidance     : Vic Pope PA-C     Assistant: None    Attending: Dr. Giuseppe Peters     Pre-operative Diagnosis: Metastatic cancer (pancreatic) requiring chemotherapy treatment     Post-operative Diagnosis: Same      Anesthesia: 1% lidocaine with epi and IV moderate sedation with Versed and Fentanyl administered and monitored by qualified nursing staff. Findings:  - Successful placement of a right infraclavicular single lumen mediport under image guidance  - Catheter tip at the SVC/RA junction  - Please refer to report on PACS for full details  - Mediport is OK for use     Specimens: None     Complications: No immediate     Estimated Blood Loss:  Minimal     Blood transfusions:  None. Implants: Please see PACS report.       Condition: Stable      Disposition: Nursing recovery unit for 1 hour     Impression: Successful right Mediport placement     AMADOR Schaefer

## 2021-12-01 NOTE — PROGRESS NOTES
Hematology/Oncology Note      Date: 2021    Name: Christel Last  : 1955        [place] PA-C         Subjective:     Chief complaint: Pancreatic mass, liver lesions    History of Present Illness:   Ms. Gary Cancino is a most pleasant 73y.o. year old female who was seen for consultation of Pancreatic mass, liver lesions. The patient was accompanied by her  during this visit. The patient has a unremarkable past medical history. -- Patient presented to ED with abdominal pain started 3 weeks ago, associated with weight loss. She denied vomiting, fever and chills. Denies EtOH abuse. She has smoked for about 15 years, just quit about a month ago. -- 10/22- Admitted for obstructive jaundice r/t new findings of pancreatic mass with multiple liver lesions, suspected pancreatic cancer with liver metastases and protein malnutrition. -- 10/20/2021 CT abd/pelvis reported large ill-defined hypo-enhancing mass in the uncinate process with mass effect and displacement of SMA/SMV, most concerned for primary malignancy.  + Numerous ill-defined hypodense masses in the liver, most compatible with metastasis. +  Marked intrahepatic and extrahepatic biliary dilatation, likely mass effect by tumor compression in the region of ampulla.  + No lymphadenopathy. -- 10/22/2021 MRCP reported 3 cm x 2.1 cm x 5 cm obstructing pancreatic head/uncinate mass consistent with neoplasm, specifically adenocarcinoma.  +  Innumerable hepatic metastatic lesions.  --10/25/2021 S/p ERCP with stent placement and biopsies taken. Bilirubin levels trend down. -- 10/26/2021 Common Bile Duct Brushing, ThinPrep reported rare atypical cells present in a background of benign ductal epithelium and bile. Rare atypical cells are present, suspicious for but not diagnostic of malignancy. These could also represent reactive/reparative ductal epithelial cells. Today she reported fatigue with poor appetite.   Her pain was controlled with current pain meds and she requested to refill pain medications. She reported both legs swelling for last few days. The patient otherwise has no other complaints. Denied fever, chills, night sweat, skin lumps or bumps, acute bleeding or bruising issues. Denied headache, acute vision change, dizziness, chest pain, worsen shortness of breath, palpitation, productive cough, nausea, vomiting, altered bowel habits, dysuria, worsen bone pain or back pain, new focal numbness or weakness. Family History: Father had colon cancer; Mother had kidney cancer      Past Medical History, Family History, and Social History:    No past medical history on file.   Past Surgical History:   Procedure Laterality Date    HX OOPHORECTOMY      1 removed    IR BX LIVER PERCUTANEOUS  11/24/2021    IR INSERT TUNL CVC W PORT OVER 5 YEARS  11/24/2021    TN ERCP DX COLLECTION SPECIMEN BRUSHING/WASHING  10/25/2021         TN ERCP STENT PLACEMENT BILIARY/PANCREATIC DUCT  10/25/2021         TN ERCP W/SPHINCTEROTOMY/PAPILLOTOMY  10/25/2021          Social History     Socioeconomic History    Marital status:      Spouse name: Not on file    Number of children: Not on file    Years of education: Not on file    Highest education level: Not on file   Occupational History    Not on file   Tobacco Use    Smoking status: Former Smoker     Packs/day: 0.50     Years: 50.00     Pack years: 25.00    Smokeless tobacco: Never Used    Tobacco comment: quit 1 month ago   Vaping Use    Vaping Use: Never used   Substance and Sexual Activity    Alcohol use: Never    Drug use: Never    Sexual activity: Yes     Partners: Male   Other Topics Concern    Not on file   Social History Narrative    Not on file     Social Determinants of Health     Financial Resource Strain:     Difficulty of Paying Living Expenses: Not on file   Food Insecurity:     Worried About Running Out of Food in the Last Year: Not on file    [address] [facility] [address] in the Last Year: Not on file   Transportation Needs:     Lack of Transportation (Medical): Not on file    Lack of Transportation (Non-Medical): Not on file   Physical Activity:     Days of Exercise per Week: Not on file    Minutes of Exercise per Session: Not on file   Stress:     Feeling of Stress : Not on file   Social Connections:     Frequency of Communication with Friends and Family: Not on file    Frequency of Social Gatherings with Friends and Family: Not on file    Attends [employer] Services: Not on file    Active Member of [address] [place] [address] [employer] or Organizations: Not on file    Attends Club or Organization Meetings: Not on file    Marital Status: Not on file   Intimate Partner Violence:     Fear of Current or Ex-Partner: Not on file    Emotionally Abused: Not on file    Physically Abused: Not on file    Sexually Abused: Not on file   Housing Stability:     Unable to Pay for Housing in the Last Year: Not on file    Number of [place] in the Last Year: Not on file    Unstable Housing in the Last Year: Not on file     Family History   Problem Relation Age of Onset    Other Mother         renal failure     Colon Cancer Father      Current Outpatient Medications   Medication Sig Dispense Refill    oxyCODONE IR (Roxicodone) 5 mg immediate release tablet Take 1 Tablet by mouth every eight (8) hours as needed for Pain for up to 30 days. Max Daily Amount: 15 mg. 84 Tablet 0    senna (SENOKOT) 8.6 mg tablet Take 1 Tablet by mouth daily for 30 days. 30 Tablet 2    multivitamin (ONE A DAY) tablet Take 1 Tablet by mouth daily.  famotidine (PEPCID) 20 mg tablet Take 1 Tablet by mouth daily. 30 Tablet 0    polyethylene glycol (Miralax) 17 gram/dose powder Take 17 g by mouth daily. 1 tablespoon with 8 oz of water daily 289 g 0       Review of Systems   Constitutional: Positive for weight loss. Negative for chills, diaphoresis, fever and malaise/fatigue.    Respiratory: Negative for cough, hemoptysis, shortness of breath and wheezing. Cardiovascular: Negative for chest pain, palpitations and leg swelling. Gastrointestinal: Positive for abdominal pain. Negative for diarrhea, heartburn, nausea and vomiting. Genitourinary: Negative for dysuria, frequency, hematuria and urgency. Musculoskeletal: Negative for joint pain and myalgias. Skin: Negative for itching and rash. Neurological: Negative for dizziness, seizures, weakness and headaches. Psychiatric/Behavioral: Negative for depression. The patient does not have insomnia. Objective:     Visit Vitals  /83 (BP 1 Location: Left upper arm, BP Patient Position: Sitting)   Pulse (!) 122   Temp 99.8 °F (37.7 °C)   Ht 5' 6\" (1.676 m)   Wt 48.8 kg (107 lb 9.6 oz)   SpO2 98%   BMI 17.37 kg/m²       ECOG Performance Status (grade): 2  0 - able to carry on all pre-disease activity w/out restriction  1 - restricted but able to carry out light work  2 - ambulatory and can self- care but unable to carry out work  3 - bed or chair >50% of waking hours  4 - completely disable, total care, confined to bed or chair    Physical Exam  Constitutional:       Appearance: Normal appearance. HENT:      Head: Normocephalic and atraumatic. Eyes:      Pupils: Pupils are equal, round, and reactive to light. Cardiovascular:      Rate and Rhythm: Normal rate and regular rhythm. Heart sounds: Normal heart sounds. Pulmonary:      Effort: Pulmonary effort is normal.      Breath sounds: Normal breath sounds. Abdominal:      General: Bowel sounds are normal.      Palpations: Abdomen is soft. Tenderness: There is no abdominal tenderness. There is no guarding. Musculoskeletal:         General: Swelling (B/L LE) present. Normal range of motion. Cervical back: Neck supple. Skin:     General: Skin is warm. Neurological:      General: No focal deficit present. Mental Status: She is alert and oriented to person, place, and time.  Mental status is at baseline. Diagnostics:      No results found for this or any previous visit (from the past 96 hour(s)). Imaging:  Results for orders placed during the hospital encounter of 11/24/21    IR BX LIVER PERCUTANEOUS    Narrative  PREOPERATIVE DIAGNOSIS: Multiple liver masses. Unknown primary. POSTOPERATIVE DIAGNOSIS: Same    INDICATION: 73years old female with history of unknown primary and recently  discovered multiple liver masses. Patient is here for US-guided core needle  biopsy of the liver lesion. ATTENDING: HUMPHREY Garcia Loud: None. PROCEDURES: Ultrasound guided core needle biopsy of the right hepatic lobe. ANESTHESIA: 1% local lidocaine as well as moderate intravenous sedation with  Versed and fentanyl given and monitored per independently trained interventional  radiology nurse under my direct supervision for 30 minutes. Please see detailed  nursing records for medication dosing. CONTRAST: None. COMPLICATIONS: None    DRAIN: No    CATHETER: None. EBL: Minimal.    SPECIMEN: 7 core biopsy specimens from the large segment 6 lesion were obtained. Specimens were placed in formalin container and given to pathology on site. Sampling was adequate. TECHNIQUE: After detailed explanation of risks and benefits of the procedure  verbal and written consent were obtained. Patient was brought to the fluoroscopy  room and placed supine on the table. Timeout was performed. Limited evaluation  of the epigastric region with ultrasound was performed. Target lesion was  identified and skin was marked. Epigastric region was prepped and draped in  usual sterile fashion. Maximum sterile barrier technique was used. 1% lidocaine  solution was instilled in the skin superficial and deep subcutaneous soft  tissues overlying the biopsy region. Under direct ultrasonographic guidance using 18-gauge Temno core biopsy needle  was advanced down through the guide into the lesion.  7 passes were made and core  biopsies were placed in formalin container and given to pathology on site. Sampling was adequate. Needle was removed. Postbiopsy imaging was obtained. Sterile dressing was applied. Hemostasis was achieved with manual compression  and Gelfoam slurry. There were no immediate complications. Patient tolerated procedure fairly well. Patient was transferred to recovery in stable condition. I was present and  performed the procedure. FINDINGS: Ultrasonographic evaluation of the hepatic parenchyma demonstrated  large heterogeneously echogenic lesions in the right as well as left hepatic  lobes. No intrahepatic or extrahepatic biliary ductal dilatation is seen. Ultrasonographic guidance demonstrated good position of the biopsy needle. Postbiopsy imaging demonstrated no evidence of bleeding. Impression  Successful, uncomplicated ultrasound-guided liver mass core needle biopsy. Results for orders placed during the hospital encounter of 10/22/21    XR ERCP / ERCB COMBINED    Narrative  INDICATION: surgery    EXAM: XR ERCP / ERCB COMBINED    FINDINGS: Fluoroscopic imaging utilized during procedure. Impression  Fluoroscopic imaging during procedure. REPORT PROVIDED FOR COMPLIANCE ONLY AT NO CHARGE. Results for orders placed during the hospital encounter of 10/20/21    CT ABD PELV W CONT    Narrative  CT of abdomen and pelvis with contrast    INDICATION: Upper abdominal pain, scleral icterus, weight loss    COMPARISON: None. TECHNIQUE: 5 mm helical scan to the abdomen and pelvis is obtained  from the  diaphragm to the symphysis pubis after uneventful nonionic IV contrast  administration.     All CT scans at this facility performed using dose optimization techniques as  appreciated to a performed exam, to include automated exposure control,  adjustment of the mA and or KU according to patient size (including appropriate  matching for site specific examination), or use of iterative reconstruction  technique. FINDINGS:    Lung Bases: Clear. Liver: There are multiple ill-defined hypodense masses/nodules with mild rim  enhancement scattered throughout, the largest measures 2.7 x 3.1 x 2.9 cm in  segment one lateral to the IVC. Gallbladder: Elongated and distended gallbladder noted. Biliary System: There is moderate to significant intrahepatic ductal dilatation  and marked common bile duct dilatation, measuring up to 1.6 cm. Marked  pancreatic ductal dilatation also demonstrated. Abrupt caliber change at very  distal common bile duct and proximal pancreatic duct above the level of ampulla. Spleen: Normal.  Pancreas: There is a large ill-defined hypodense mass identified in the uncinate  process, roughly measures 2.1 x 3.0 x 5.0 cm. There is moderate mass effect with  lateral displacement SMA/SMV and partial encasement of SMV. Bowel: The small and large bowel are nondilated. Adrenal Glands: Normal.  Kidneys: 1 cm cortical cyst in the lateral midpole of right kidney. Lower genitourinary system: The bladder is poorly distended. The uterus is  lobulated. There is 3.3 x 3.4 cm heterogeneous enhancing mass exophytically seen  in anterior uterus. Peritoneum/Retroperitoneum: No adenopathy. Vasculature: Moderate atherosclerotic aortic disease. Other: Small free fluid in the pelvic floor. .    CT OSSEOUS STRUCTURES:    Mild spondylosis. Impression  1. Large ill-defined hypoenhancing mass in the uncinate process with mass effect  and displacement of SMA/SMV, most concerned for primary malignancy. 2. Numerous ill-defined hypodense masses in the liver, most compatible with  metastasis. 3. Marked intrahepatic and extrahepatic biliary dilatation, likely mass effect  by tumor compression in the region of ampulla. 4. No lymphadenopathy. 5. Small left renal cyst. Small pelvic free fluid.     Thank you for this referral.        Pathology          Assessment: 1. Pancreatic carcinoma metastatic to liver ([address]    2. Pancreatic adenocarcinoma ([address]    3. Liver metastases ([address]    4. Obstructive jaundice    5. Normocytic anemia    6. Iron deficiency anemia, unspecified iron deficiency anemia type    7. Pancreatic mass    8. Pain of right upper extremity    9. Malignant carcinoid tumors of other sites ([address]    10. Pain and swelling of lower extremity, unspecified laterality        Plan:                                        # Metastatic Pancreatic adenocarcinoma  # Liver metastases  # Obstructive jaundice-- s.p stent placement  -- Patient presented to ED with abdominal pain started 3 weeks ago, associated with weight loss. She denied vomiting, fever and chills. Denies EtOH abuse. She has smoked for about 15 years, just quit about a month ago. -- 10/22-26/2021 Admitted for obstructive jaundice r/t new findings of pancreatic mass with multiple liver lesions, suspected pancreatic cancer with liver metastases and protein malnutrition. -- 10/20/2021 CT abd/pelvis reported large ill-defined hypo-enhancing mass in the uncinate process with mass effect and displacement of SMA/SMV, most concerned for primary malignancy.  + Numerous ill-defined hypodense masses in the liver, most compatible with metastasis. +  Marked intrahepatic and extrahepatic biliary dilatation, likely mass effect by tumor compression in the region of ampulla.  + No lymphadenopathy. -- 10/22/2021 MRCP reported 3 cm x 2.1 cm x 5 cm obstructing pancreatic head/uncinate mass consistent with neoplasm, specifically adenocarcinoma.  +  Innumerable hepatic metastatic lesions.  --10/25/2021 S/p ERCP with stent placement and biopsies taken. Bilirubin levels trend down. -- 10/26/2021 Common Bile Duct Brushing, ThinPrep reported rare atypical cells present in a background of benign ductal epithelium and bile. Rare atypical cells are present, suspicious for but not diagnostic of malignancy.  These could also represent reactive/reparative ductal epithelial cells. -- She requested to refill pain medications. She reported right arm swelling for last few days. PET scan   -- 11/11/2021 PET scan reported a known pancreatic mass is diffusely hypermetabolic consistent with  Malignancy.  + Numerous hepatic mass lesions are variably hypermetabolic consistent with metastasis with variable degrees of central necrosis.  + No focal hypermetabolic lesions identified elsewhere to diagnose additional metastatic disease. -- 11/24/2021 IR Mediport placement  -- 11/30/2021 IR- Liver mass, core biopsies reported consistent with metastatic adenocarcinoma of pancreatic origin. -- Today I have discussed with the patient and her family-  and son about above PET and biopsy reports. I have explained to the patient and family that the goals of treatment of metastatic pancreatic cancer are not curable but treatable in order to prolong survival and improve quality of life by reducing cancer-related symptoms. We have discussed about palliative chemotherapy options. Given her current PS and serum bilirubin total/dirent of 2.0/1.6 despite stenting, we have recommeded modified FOLFOX6 over a gemcitabine-containing regimen due to concerned gemcitabine toxicity. I have discussed briefly about potential side effects/risk of chemotherapy and alternatives, the patient was agreeable with the plan. Plan:  -- Tumor NGS [facility]; [facility] germline genetic test  -- Plan to start palliative mFOLFOX6 in next week tentatively. -- Labs: CBC, CMP, Iron profile, ferritin, B12/Folate, CA 19-9  -- We will see the patient back next week for pre-chemo assessment. Always sooner if required.       Normocytic Anemia  Iron def anemia  -- We will plan for IV Injectafer x 2        B/l LE swelling  -- B/L LE Venous doppler to r/o DVT      Abdominal pain  -- Better controlled with current regimen  -- Refill Oxycodone 5 mg Q8Hr PRN today      Protein malnutrition. Weight loss  -- Nutritionist consulted        Orders Placed This Encounter    oxyCODONE IR (Roxicodone) 5 mg immediate release tablet     Sig: Take 1 Tablet by mouth every eight (8) hours as needed for Pain for up to 30 days. Max Daily Amount: 15 mg. Dispense:  84 Tablet     Refill:  0    senna (SENOKOT) 8.6 mg tablet     Sig: Take 1 Tablet by mouth daily for 30 days. Dispense:  30 Tablet     Refill:  2           Ms. Ashley Garcia has a reminder for a \"due or due soon\" health maintenance. I have asked that she contact her primary care provider for follow-up on this health maintenance. All of patient's questions answered to their apparent satisfaction. They verbally show understanding and agreement with aforementioned plan. Mildred Mchugh MD  12/2/2021              Above mentioned total time spent for this encounter with more than 50% of the time spent in face-to-face counseling, discussing on diagnosis and management plan going forward, and co-ordination of care. Parts of this document has been produced using Dragon dictation system. Unrecognized errors in transcription may be present. Please do not hesitate to reach out for any questions or clarifications.         CC: Daniella Thacker PA-C

## 2021-12-02 PROBLEM — C25.9 PANCREATIC CARCINOMA METASTATIC TO LIVER (HCC): Status: ACTIVE | Noted: 2021-01-01

## 2021-12-02 PROBLEM — D50.9 IRON DEFICIENCY ANEMIA, UNSPECIFIED: Status: ACTIVE | Noted: 2021-01-01

## 2021-12-02 PROBLEM — C78.7 PANCREATIC CARCINOMA METASTATIC TO LIVER (HCC): Status: ACTIVE | Noted: 2021-01-01

## 2021-12-06 NOTE — PROGRESS NOTES
CHEMOTHERAPY EDUCATION/PATIENT TEACHING    NAME: Capri Dewitt  : 1955  DATE: 21    Diagnosis: Metastatic Pancreatic Adenocarcinoma, Liver Metastatsis    Treatment: mFOLFOX6    Ms. Capri Dewitt is a 73 y.o. female who was seen for Chemotherapy Education today. She is accompanied by her . She was diagnosed with Metastatic Pancreatic Adenocarcinoma, Liver Metastasis and will be treated with mFOLFOX6. The benefits, risks, and potential adverse effects of these combination chemotherapy including but not limited to cytopenias, neutropenic fever, potential death, peripheral neuropathy, GI perforation, impaired wound healing, bleeding, nausea vomiting, fatigue, proteinuria etc. were discussed with patient. All of her questions were answered. She agreed to continue with the plan. Teach-back Method used. Patient signed consent. Chemotherapy information printed and provided to the patient. LEARNING ASSESSMENT:     Patient- Barriers to learning [] Yes /  [x] No  Primary Language: English   Preferred method of teaching;   reading[x] Yes /  [] No, demonstration/visual[x] Yes /  [] No     Co-learner present ? [x] Yes /  [] No    Preferred method of teaching;   reading[x] Yes /  [] No, NA demonstration/visual[] Yes /  [] No NA         Mediport placement: 2021  Prechemo labs on 2021  Treatment tentatively scheduled on 2021         Patient verbalized understanding [x] Yes /  [] No  Acceptance of teaching, eager to learn [x] Yes /  [] No  Acceptance of teaching but needs reinforcement [] Yes /  [x] No        Total time:       Anais MCDUFFIE  [address] [facility], [address], [facility]  21

## 2021-12-06 NOTE — PROGRESS NOTES
[facility] Lab Visit:    Christel Last  1955  [id number]    [id number] Pt arrived ambulatory w/o assist. Labs drawn per order via left AC venipuncture x1 attempt. Pt tolerated well without complaints. Gauze/ coban to site. Pt departed [facility] ambulatory and in no distress at 1440.      Visit Vitals  /72 (BP Patient Position: Sitting)   Pulse 98   Temp 98.1 °F (36.7 °C)   Resp 16   Ht 5' 6\" (1.676 m)   SpO2 98%   BMI 17.37 kg/m²

## 2021-12-07 NOTE — PROGRESS NOTES
Pharmacy Note     Name: Leatha Benites  : 1955  Estimated body surface area is 1.5 meters squared as calculated from the following:    Height as of this encounter: 167.6 cm (66\"). Weight as of this encounter: 48.5 kg (107 lb). Diagnosis: Metastatic Pancreatic Adenocarcinoma, Liver Metastatsis  Treatment Plan: mFOLFOX6  Cycle/Day: C1D1  Cycle Start Date: 2021    Lab Results   Component Value Date/Time    WBC 25.7 ([facility]) 2021 08:35 AM    PLATELET   02:30 PM     UNABLE TO REPORT ACCURATE COUNT DUE TO PLATELET AGGREGATION, HOWEVER, PLATELETS APPEAR NORMAL IN NUMBER ON SMEAR. PLEASE RESUBMIT SODIUM CITRATE (BLUE) AND EDTA (LAVENDER) TUBES FOR HEMATOLOGICAL TESTING. Lab Results   Component Value Date/Time    ABS. NEUTROPHILS 21.6 (H) 2021 08:35 AM     Lab Results   Component Value Date/Time    Creatinine, POC 0.6 10/20/2021 12:07 PM    Creatinine 0.58 (L) 2021 02:30 PM     Most Recent Creatinine Clearance:  CrCl: 69.6 mL/min (based on Adjusted Body Weight)  Creatinine: 0.70 mg/dL (2021)      Pharmacy Intervention:  Reviewed patient's pre chemo labs. LFTs and bilirubin elevated.     WBC and ANC also elevated due to inflammatory process  Discussed with Dr. Enio Rodriguez to proceed with current doses of mFOLFOX6 with Cycle 1 on 2021  Pharmacy will continue to monitor     Pharmacist: Rosa Palmer, [id number] [facility]

## 2021-12-07 NOTE — PROGRESS NOTES
[address] \[facility] [place] [facility]\"" Progress Note    Date: 2021    Name: Allie Stein              MRN: [id number]              : 1955    Chemotherapy Cycle: Folfox C1. Ms. Ros Cedeño was assessed and education was provided. Ms. Judah Diego vitals were reviewed and patient was observed for 5 minutes prior to treatment. Visit Vitals  /74 (BP 1 Location: Left upper arm, BP Patient Position: Sitting)   Pulse (!) 112   Temp 99 °F (37.2 °C)   Resp 18   Ht 5' 6\" (1.676 m)   Wt 48.5 kg (107 lb)   SpO2 97%   Breastfeeding No   BMI 17.27 kg/m²       Lab results were obtained on 2021  and reviewed. Recent Results (from the past 12 hour(s))   CBC WITH 3 PART DIFF    Collection Time: 21  8:35 AM   Result Value Ref Range    WBC 25.7 (HH) 4.5 - 13.0 K/uL    RBC 3.76 (L) 4.10 - 5.10 M/uL    HGB 10.6 (L) 12.0 - 16 g/dL    HCT 33.3 (L) 36 - 48 %    MCV 88.6 78 - 102 FL    MCH 28.2 25.0 - 35.0 PG    MCHC 31.8 31 - 37 g/dL    RDW 18.2 (H) 11.5 - 14.5 %    NEUTROPHILS 84 (H) 40 - 70 %    MIXED CELLS 7 0.1 - 17 %    LYMPHOCYTES 9 (L) 14 - 44 %    ABS. NEUTROPHILS 21.6 (H) 1.8 - 9.5 K/UL    ABS. MIXED CELLS 1.8 0.0 - 2.3 K/uL    ABS. LYMPHOCYTES 2.3 1.1 - 5.9 K/UL    DF AUTOMATED     CBC WITH AUTOMATED DIFF    Collection Time: 21  9:16 AM   Result Value Ref Range    WBC 26.1 (H) 4.6 - 13.2 K/uL    RBC 3.76 (L) 4.20 - 5.30 M/uL    HGB 10.3 (L) 12.0 - 16.0 g/dL    HCT 31.7 (L) 35.0 - 45.0 %    MCV 84.3 78.0 - 100.0 FL    MCH 27.4 24.0 - 34.0 PG    MCHC 32.5 31.0 - 37.0 g/dL    RDW 16.9 (H) 11.6 - 14.5 %    PLATELET 996 460 - 345 K/uL    MPV 10.9 9.2 - 11.8 FL    NRBC 0.0 0  WBC    ABSOLUTE NRBC 0.00 0.00 - 0.01 K/uL    NEUTROPHILS PENDING %    LYMPHOCYTES PENDING %    MONOCYTES PENDING %    EOSINOPHILS PENDING %    BASOPHILS PENDING %    IMMATURE GRANULOCYTES PENDING %    ABS. NEUTROPHILS PENDING K/UL    ABS. LYMPHOCYTES PENDING K/UL    ABS. MONOCYTES PENDING K/UL    ABS. EOSINOPHILS PENDING K/UL    ABS. BASOPHILS PENDING K/UL    ABS. IMM. GRANS. PENDING K/UL    DF PENDING      Her right chest mediport accessed with 20 gauge 1 inch strickland needle. Flushes easily and noted with brisk blood return. D5W hung to run at [facility] and concurrently with chemo today. Pre-medications (Aloxi 0.25 mg IVP and Decadron 12 mg IVPB) were administered as ordered and chemotherapy was initiated. Leucovorin 600 mg IV and Oxaliplatin 130 mg IV were administered concurrently to run over 2 hours as ordered. Patient was observed for 30 minutes post infusions and blood return was re-verified from port. Fluorouracil 600 mg IVP was administered as ordered followed by Fluorouracil 3600 mg IV CADD PUMP to be administered over 46 hours. Patient and spouse at the bedside given CADD Pump information and spill kit to take home. Education regarding the CADD pump was given and their full understanding was noted. All questions were answered at this time. Right chest mediport remains intact and secured with Tegaderm dressing. Ms. Ruba Simon tolerated infusion, and had no complaints at this time. Patient armband removed and shredded. Ms. Ruba Simon was discharged from [place] [address] in stable condition at 1320. She is to return on 12/9/2021 at 1400 for her next appointment for pump d/c.      Julita Plasencia  December 7, 2021  2:13 PM

## 2021-12-08 NOTE — PROGRESS NOTES
Unable to speak to patient. Called her number and her 's number x2 LVOM. Called patient Daughter Ms. Laure Boyd to inform patient to call office back and that  Eliquis 5 mg sent to patient pharmacy for pick to day with the instruction to take 10 mg twice a day for 7 days and than followed by 5 mg twice a day for the bilateral DVT as reported on Duplex of the lower extremities done today 12/8/2021

## 2021-12-08 NOTE — TELEPHONE ENCOUNTER
Jd from [facility] received orders for medical equipment manual wheelchair and 4 point walker. Need more details as to what kind of walker that is needed. Please call [phone] so order can be processed for patient.

## 2021-12-08 NOTE — PROGRESS NOTES
Spoke to patient spouse to make sure they got my message about her Eliquis. He reported that they have not  medication from pharmacy yet because the pharmacy said medication truck it on it way. Recommended to send the medication to a different pharmacy but patient spouse decline and states they will call pharmacy back to follow up.  Advise them to go to the ER if patient is having any issues such chest pain, shortness of breath

## 2021-12-08 NOTE — PROGRESS NOTES
Spoke with patient's daughter Candy Renee) faxed prescription for four-point walker to [facility] at [phone]   Phone # [phone]

## 2021-12-09 NOTE — PROGRESS NOTES
[facility] Progress Note    Date: 2021    Name: Jason Herrera    MRN: [id number]         : 1955    Injectafer #1 of 2 Infusion, Cadd Pump Removal and Neulasta    Ms. Aniket Mcduffie to [facility], via wheelchair, at 80 with her . Pt was assessed and education was provided. Ms. Nicolette Wise vitals were reviewed and patient was observed for 5 minutes prior to treatment. Visit Vitals  BP 94/73 (BP 1 Location: Left upper arm, BP Patient Position: Sitting)   Pulse (!) 120   Temp 98.2 °F (36.8 °C)   Resp 18   SpO2 96%         RIGHT Chest wall mediport previously accessed for chemo. Brisk blood return noted.  ml initiated at [facility]. Injectafer 750 mg in 250 ml NS infused over 20 minutes as ordered. Ms. Aniket Mcduffie tolerated the infusion, and had no complaints. VS remained stable. Neulasta OBI applied to patient's left arm after patient education completed. Patient and her  verbalized understanding. Copy of information provided to patient to take home. Instructed to removed OBI at 7pm tomorrow for a total of 28 hrs. Green light flushing prior to discharge. Patient stayed 30 minutes post Injectafer infusion and OBI application. No signs or symptoms of reaction noted. RIGHT chest wall mediport flushed with NS 20 ml and Heparin 500 ml per protocol. Tang needle removed. No bleeding or irritation noted at site. Band-aid applied to site. Reviewed discharge instructions with patient and  re: Injectafer including expected side effects (nausea and muscle cramps) and signs of allergic reaction requiring medical attention (itching/hives/rashes, SOB, chest pain, lip/tongue/facial swelling). Patient given printed copy to take home. Patient verbalized understanding of discharge instructions. Patient armband removed and shredded. Ms. Aniket Mcduffie was discharged from [place] [address] in stable in stable condition at 1530.   She is to return on  at 1300 for her next [facility] appointment.        Margarita Arias  December 9, 2021  3:45 PM

## 2021-12-09 NOTE — ED NOTES
Contacted pt's  and he stated provider called them and told them they did not need to stay, that he would call in prescription - which he did, and they have picked up. Pt's  aware that they can return at any time.

## 2021-12-10 NOTE — NURSE NAVIGATOR
Follow up call to patient, spoke with  who stated that he received Eliquis from pharmacy and started treatment yesterday 12/9/21-U/S of LE showed acute non-occlusive deep vein thrombosis in the bilateral proximal femoral vein and rt tibial vein. Pt advised to go to ER should pt experience any SOB.  stated that pt had some diarrhea after chemo on 12/7/21 but was controlled with imodium. Denies nausea and vomiting. Admits to decrease appetite-was seen by nutritionist, advised  to supplement meals with ensure, and stay hydrated with water and gatorade. If appetite not improved during next visit with Dr. Bud Tucker request appetite stimulant. Next chemo scheduled for 12/21/21.

## 2021-12-13 NOTE — ED NOTES
Soft mouth sponges, pt education medication and home care education provided. Pt discharge instructions reviewed with patient, patient denies questions and verbalizes understanding. IVs removed and all belongings with patient. Pt alert and oriented, no signs of distress. Pt assisted to wheelchair and transported to ED waiting room.

## 2021-12-13 NOTE — ED PROVIDER NOTES
EMERGENCY DEPARTMENT HISTORY AND PHYSICAL EXAM  This was created with voice recognition software and transcription errors may be present. 9:15 AM  Date: 12/13/2021  Patient Name: Christel Montiel    History of Presenting Illness     Chief Complaint:    History Provided By:     HPI: Christel Montiel is a 73 y.o. female past medical history of pancreatic cancer who recently started chemo on Wednesday and had a infusion placed and that was removed either Thursday or Friday per . He notes that since then she has been having diarrhea. Has had poor p.o. intake additionally she was recently diagnosed with 2 clots in her legs and placed on Xarelto  says that since maybe Friday or Saturday this has been causing bleeding gums with nausea and abdominal bloating. No fevers or chills. PCP: Nishi Jackson MD      Past History     Past Medical History:  History reviewed. No pertinent past medical history. Past Surgical History:  Past Surgical History:   Procedure Laterality Date    HX OOPHORECTOMY      1 removed    IR BX LIVER PERCUTANEOUS  11/24/2021    IR INSERT TUNL CVC W PORT OVER 5 YEARS  11/24/2021    FL ERCP DX COLLECTION SPECIMEN BRUSHING/WASHING  10/25/2021         FL ERCP STENT PLACEMENT BILIARY/PANCREATIC DUCT  10/25/2021         FL ERCP W/SPHINCTEROTOMY/PAPILLOTOMY  10/25/2021            Family History:  Family History   Problem Relation Age of Onset    Other Mother         renal failure     Colon Cancer Father        Social History:  Social History     Tobacco Use    Smoking status: Former Smoker     Packs/day: 0.50     Years: 50.00     Pack years: 25.00    Smokeless tobacco: Never Used    Tobacco comment: quit 1 month ago   Vaping Use    Vaping Use: Never used   Substance Use Topics    Alcohol use: Never    Drug use: Never       Allergies:  No Known Allergies    Review of Systems     Review of Systems   All other systems reviewed and are negative.     10 point review of systems otherwise negative unless noted in HPI. Physical Exam       Physical Exam  Constitutional:       Comments: Thin   HENT:      Head: Normocephalic and atraumatic. Eyes:      Pupils: Pupils are equal, round, and reactive to light. Cardiovascular:      Rate and Rhythm: Normal rate and regular rhythm. Heart sounds: Normal heart sounds. No murmur heard. No friction rub. Pulmonary:      Effort: Pulmonary effort is normal. No respiratory distress. Breath sounds: Normal breath sounds. No wheezing. Abdominal:      General: There is no distension. Palpations: Abdomen is soft. Tenderness: There is no abdominal tenderness. There is no guarding or rebound. Musculoskeletal:         General: Normal range of motion. Cervical back: Normal range of motion and neck supple. Skin:     General: Skin is warm and dry. Neurological:      Mental Status: She is oriented to person, place, and time. Psychiatric:         Behavior: Behavior normal.         Thought Content: Thought content normal.         Diagnostic Study Results     Vital Signs   Visit Vitals  /62   Pulse (!) 107   Temp 98 °F (36.7 °C)   Resp 15   Ht 5' 6\" (1.676 m)   Wt 48.5 kg (107 lb)   SpO2 100%   BMI 17.27 kg/m²      EKG:  Labs: White count 29 1% bands shift lactic 2.16 chemistry unremarkable  Imaging:     Medical Decision Making     ED Course: Progress Notes, Reevaluation, and Consults:    I will be the provider of record for this patient. Provider Notes (Medical Decision Making):   28-year-old female history of pancreatic cancer presents with diarrhea since her chemo started on Wednesday also having some fatigue and bleeding gums with nausea and abdominal bloating that began after starting the Xarelto.   She is noted to be tachycardic hypotensive poor p.o. intake per  will start with some IV fluids although she is also noted to be tachycardic hypotensive on her to the infusion center on the ninth    Appears to be on ferric carboxy multidose that is a 3-day pump patient has pancreatic cancer with mets to her liver    Discussed with Dr. Carlie Salgado from oncology notes this is expected white count for pancreatic cancer. We will also discuss with the family CT imaging and admission versus discharge. Patient did not get Neupogen      Patient now feels 100% better 12:30 PM after fentanyl and some fluids. We discussed the potential for infection that Dr. Kojo Morris did state that he thinks this is from her cancer patient states she feels well she does not have any pain she does not have any dark or tarry stools no upper GI bleeding no fevers no chills she wants to go home at this point we discussed that. We also discussed return precautions and continued bleeding. She is on Eliquis and having some slight bleeding from her gums which is currently controlled recommended salt water gargle. Additionally we discussed that the patient may have a small PE but I don't think there is a significant PE to result in a change in therapy. She expressed understanding her family was bedside will discharge for outpatient follow-up       Diagnosis     Clinical Impression: No diagnosis found. Disposition:        Patient's Medications   Start Taking    No medications on file   Continue Taking    APIXABAN (ELIQUIS) 5 MG TABLET    Please take 10 mg (two 5 mg tablets) by mouth twice a day for 7 days,  followed by 5 mg (1 tablet) by mouth twice a day  Indications: blood clot in a deep vein of the extremities    FAMOTIDINE (PEPCID) 20 MG TABLET    Take 1 Tablet by mouth daily. LIDOCAINE-PRILOCAINE (EMLA) TOPICAL CREAM    Apply to Mediport 1 hour prior to chemotherapy. Place kitchen saran wrap over cream and port. This will numb site.     LOPERAMIDE (IMMODIUM) 2 MG TABLET    Take 2 tabs=4mg after first loose stool, then 2 mg(1 tab) after each loose stool after that up to maximum of 16mg (8tabs) in 1 day    LORATADINE (CLARITIN) 10 MG TABLET    Take 1 tablet by mouth 3 days beginning the day before NEULASTA injection along with your pain medication for bone and muscle pain. MULTIVITAMIN (ONE A DAY) TABLET    Take 1 Tablet by mouth daily. ONDANSETRON HCL (ZOFRAN) 8 MG TABLET    Take 1 tablet every 8 hours for nausea and vomiting beginning the night of your chemotherapy treatment for 3 days. OXYCODONE IR (ROXICODONE) 5 MG IMMEDIATE RELEASE TABLET    Take 1 Tablet by mouth every eight (8) hours as needed for Pain for up to 30 days. Max Daily Amount: 15 mg. POLYETHYLENE GLYCOL (MIRALAX) 17 GRAM/DOSE POWDER    Take 17 g by mouth daily. 1 tablespoon with 8 oz of water daily    SENNA (SENOKOT) 8.6 MG TABLET    Take 1 Tablet by mouth daily for 30 days.    These Medications have changed    No medications on file   Stop Taking    No medications on file

## 2021-12-13 NOTE — TELEPHONE ENCOUNTER
Dr. Yunior Barajas patients spouse left a voicemail he would really like to speak to you or the NP. He had to take his wife to the ER today she was having difficulty with the swelling in her feet, pain in the abdomen and bleeding out of the mouth. He is very worried and concerned.

## 2021-12-13 NOTE — ED TRIAGE NOTES
Pt arrived EMS from home for abdominal pain, diarrhea starting on Friday and bleeding from gums starting Wednesday. Pt started chemo treatment on Wednesday, 12/8/21 for stage 4 Pancreatic cancer and liver cancer. Pain started Wednesday. Pt discharged from [facility] for blood clots in lower extremity and started Eliquis.     Hx   HX OOPHORECTOMY [SHX86]   1 removed   DC ERCP W/SPHINCTEROTOMY/PAPILLOTOMY [43612]  10/25/2021     DC ERCP STENT PLACEMENT BILIARY/PANCREATIC DUCT [[id number]]  10/25/2021     DC ERCP DX COLLECTION SPECIMEN BRUSHING/WASHING [17477]  10/25/2021     IR INSERT TUNL CVC W PORT OVER 5 YEARS [VCO1698]  11/24/2021    IR BX LIVER PERCUTANEOUS [ACP1134]

## 2021-12-15 NOTE — NURSE NAVIGATOR
Follow up call to patient after ED visit, spoke with  who stated that patient was not having pain on the fentanyl patch and the diarrhea had stopped. However pt is refusing all further treatments including Injectafer that is scheduled for tomorrow-12/16/21. Dr. Roberto Thomas informed-will hold patient's treatment this week-week of December 16 th-pt will follow up with Dr. Roberto Thomas next week to discuss next step.

## 2021-12-20 NOTE — PROGRESS NOTES
[facility] Progress Note    Date: 2021    Name: Leatha Benites    MRN: [id number]         : 1955    Hydration and labs. Ms. Kemar Chapman to [facility], via wheelchair, at [phone] with her . Pt was assessed and education was provided. Patient sent from office side for a low BP and hydration. Ms. Vance Gardner vitals were reviewed and patient was observed for 5 minutes prior to treatment. Visit Vitals  BP 98/62   Pulse (!) 119   Temp 97 °F (36.1 °C)   Resp 18   SpO2 97%         RIGHT Chest wall mediport previously accessed for chemo. Brisk blood return noted. Labs obtained for CBC and CMP and sent to [facility] lab for processing. Normal Saline 1000 Liters administered as ordered. Ms. Kemar Chapman tolerated the infusion, and had no complaints. VS remained stable. RIGHT chest wall mediport flushed with NS 20 ml and Heparin 500 ml per protocol. Tang needle removed. No bleeding or irritation noted at site. Band-aid applied to site. Patient armband removed and shredded. Ms. Kemar Chapman was discharged from [place] [address] in stable in stable condition at 1315. She has no future appointments with [facility] at this time.       Julita Plasencia RN  2021  3:45 PM

## 2021-12-20 NOTE — NURSE NAVIGATOR
Follow up visit with Dr. Irish Whittaker today, patient accompanied by , son and daughter. Pt expressed that she did not want to continue with treatment. Rereferred to [id number] scheduled for 12/21/21 to meet with hospice team. Patient will have hydration today. All questions answered.

## 2021-12-20 NOTE — PROGRESS NOTES
Hematology/Oncology Note      Date: 2021    Name: Allie Stein  : 1955        Sapphire Dow MD         Subjective:     Chief complaint: Pancreatic mass, liver lesions    History of Present Illness:   Ms. Ros Cedeño is a most pleasant 73y.o. year old female who was seen for consultation of Pancreatic mass, liver lesions. The patient was accompanied by her  during this visit. The patient has a unremarkable past medical history. -- Patient presented to ED with abdominal pain started 3 weeks ago, associated with weight loss. She denied vomiting, fever and chills. Denies EtOH abuse. She has smoked for about 15 years, just quit about a month ago. -- 10/22- Admitted for obstructive jaundice r/t new findings of pancreatic mass with multiple liver lesions, suspected pancreatic cancer with liver metastases and protein malnutrition. -- 10/20/2021 CT abd/pelvis reported large ill-defined hypo-enhancing mass in the uncinate process with mass effect and displacement of SMA/SMV, most concerned for primary malignancy.  + Numerous ill-defined hypodense masses in the liver, most compatible with metastasis. +  Marked intrahepatic and extrahepatic biliary dilatation, likely mass effect by tumor compression in the region of ampulla.  + No lymphadenopathy. -- 10/22/2021 MRCP reported 3 cm x 2.1 cm x 5 cm obstructing pancreatic head/uncinate mass consistent with neoplasm, specifically adenocarcinoma.  +  Innumerable hepatic metastatic lesions.  --10/25/2021 S/p ERCP with stent placement and biopsies taken. Bilirubin levels trend down. -- 10/26/2021 Common Bile Duct Brushing, ThinPrep reported rare atypical cells present in a background of benign ductal epithelium and bile. Rare atypical cells are present, suspicious for but not diagnostic of malignancy. These could also represent reactive/reparative ductal epithelial cells. The patient was accompanied by her , daughter, and son today.  Patient reported worsening fatigue, poor appetite. Cachexia (+). She has declined recent IV Iron therapy. Family reported she has declined to take all medications. Denied fever, chills, night sweat, skin lumps or bumps, acute bleeding or bruising issues. Denied headache, acute vision change, dizziness, chest pain, worsen shortness of breath, palpitation, productive cough, nausea, vomiting, altered bowel habits, dysuria, worsen bone pain or back pain, new focal numbness or weakness. Family History: Father had colon cancer; Mother had kidney cancer      Past Medical History, Family History, and Social History:    History reviewed. No pertinent past medical history.   Past Surgical History:   Procedure Laterality Date    HX OOPHORECTOMY      1 removed    IR BX LIVER PERCUTANEOUS  11/24/2021    IR INSERT TUNL CVC W PORT OVER 5 YEARS  11/24/2021    RI ERCP DX COLLECTION SPECIMEN BRUSHING/WASHING  10/25/2021         RI ERCP STENT PLACEMENT BILIARY/PANCREATIC DUCT  10/25/2021         RI ERCP W/SPHINCTEROTOMY/PAPILLOTOMY  10/25/2021          Social History     Socioeconomic History    Marital status:      Spouse name: Not on file    Number of children: Not on file    Years of education: Not on file    Highest education level: Not on file   Occupational History    Not on file   Tobacco Use    Smoking status: Former Smoker     Packs/day: 0.50     Years: 50.00     Pack years: 25.00    Smokeless tobacco: Never Used    Tobacco comment: quit 1 month ago   Vaping Use    Vaping Use: Never used   Substance and Sexual Activity    Alcohol use: Never    Drug use: Never    Sexual activity: Yes     Partners: Male   Other Topics Concern    Not on file   Social History Narrative    Not on file     Social Determinants of Health     Financial Resource Strain:     Difficulty of Paying Living Expenses: Not on file   Food Insecurity:     Worried About Running Out of Food in the Last Year: Not on file    Cara ya Food in the Last Year: Not on file   Transportation Needs:     Lack of Transportation (Medical): Not on file    Lack of Transportation (Non-Medical): Not on file   Physical Activity:     Days of Exercise per Week: Not on file    Minutes of Exercise per Session: Not on file   Stress:     Feeling of Stress : Not on file   Social Connections:     Frequency of Communication with Friends and Family: Not on file    Frequency of Social Gatherings with Friends and Family: Not on file    Attends Confucianist Services: Not on file    Active Member of [address] [place] [address] [employer] or Organizations: Not on file    Attends Club or Organization Meetings: Not on file    Marital Status: Not on file   Intimate Partner Violence:     Fear of Current or Ex-Partner: Not on file    Emotionally Abused: Not on file    Physically Abused: Not on file    Sexually Abused: Not on file   Housing Stability:     Unable to Pay for Housing in the Last Year: Not on file    Number of [place] in the Last Year: Not on file    Unstable Housing in the Last Year: Not on file     Family History   Problem Relation Age of Onset    Other Mother         renal failure     Colon Cancer Father      Current Outpatient Medications   Medication Sig Dispense Refill    fentaNYL (Duragesic) 25 mcg/hr PATCH 1 Patch by TransDERmal route every seventy-two (72) hours for 30 days. Max Daily Amount: 1 Patch. 10 Patch 0    apixaban (ELIQUIS) 5 mg tablet Please take 10 mg (two 5 mg tablets) by mouth twice a day for 7 days,  followed by 5 mg (1 tablet) by mouth twice a day  Indications: blood clot in a deep vein of the extremities 90 Tablet 1    lidocaine-prilocaine (EMLA) topical cream Apply to Mediport 1 hour prior to chemotherapy. Place kitchen saran wrap over cream and port. This will numb site. 30 g 3    ondansetron hcl (Zofran) 8 mg tablet Take 1 tablet every 8 hours for nausea and vomiting beginning the night of your chemotherapy treatment for 3 days.  30 Tablet 1    loratadine (Claritin) 10 mg tablet Take 1 tablet by mouth 3 days beginning the day before NEULASTA injection along with your pain medication for bone and muscle pain. 30 Tablet 2    loperamide (IMMODIUM) 2 mg tablet Take 2 tabs=4mg after first loose stool, then 2 mg(1 tab) after each loose stool after that up to maximum of 16mg (8tabs) in 1 day 30 Tablet 2    oxyCODONE IR (Roxicodone) 5 mg immediate release tablet Take 1 Tablet by mouth every eight (8) hours as needed for Pain for up to 30 days. Max Daily Amount: 15 mg. 84 Tablet 0    senna (SENOKOT) 8.6 mg tablet Take 1 Tablet by mouth daily for 30 days. 30 Tablet 2    multivitamin (ONE A DAY) tablet Take 1 Tablet by mouth daily.  famotidine (PEPCID) 20 mg tablet Take 1 Tablet by mouth daily. 30 Tablet 0    polyethylene glycol (Miralax) 17 gram/dose powder Take 17 g by mouth daily. 1 tablespoon with 8 oz of water daily 289 g 0       Review of Systems   Constitutional: Positive for malaise/fatigue and weight loss. Negative for chills, diaphoresis and fever. Respiratory: Negative for cough, hemoptysis, shortness of breath and wheezing. Cardiovascular: Negative for chest pain, palpitations and leg swelling. Gastrointestinal: Positive for abdominal pain. Negative for diarrhea, heartburn, nausea and vomiting. Genitourinary: Negative for dysuria, frequency, hematuria and urgency. Musculoskeletal: Negative for joint pain and myalgias. Skin: Negative for itching and rash. Neurological: Negative for dizziness, seizures, weakness and headaches. Psychiatric/Behavioral: Negative for depression. The patient does not have insomnia.              Objective:     Visit Vitals  Temp 97.6 °F (36.4 °C) (Temporal)   Resp 18   Ht 5' 6\" (1.676 m)   Wt 47.3 kg (104 lb 3.2 oz)   BMI 16.82 kg/m²       ECOG Performance Status (grade): 3  0 - able to carry on all pre-disease activity w/out restriction  1 - restricted but able to carry out light work  2 - ambulatory and can self- care but unable to carry out work  3 - bed or chair >50% of waking hours  4 - completely disable, total care, confined to bed or chair    Physical Exam  Constitutional:       Appearance: Normal appearance. She is ill-appearing. Comments: Cachexia (+). HENT:      Head: Normocephalic and atraumatic. Eyes:      Pupils: Pupils are equal, round, and reactive to light. Cardiovascular:      Rate and Rhythm: Normal rate and regular rhythm. Heart sounds: Normal heart sounds. Pulmonary:      Effort: Pulmonary effort is normal.      Breath sounds: Normal breath sounds. Abdominal:      General: Bowel sounds are normal.      Palpations: Abdomen is soft. Tenderness: There is no abdominal tenderness. There is no guarding. Musculoskeletal:         General: Swelling (B/L LE) present. Normal range of motion. Cervical back: Neck supple. Skin:     General: Skin is warm. Neurological:      General: No focal deficit present. Mental Status: She is alert and oriented to person, place, and time. Mental status is at baseline. Diagnostics:      No results found for this or any previous visit (from the past 96 hour(s)). Imaging:  Results for orders placed during the hospital encounter of 11/24/21    IR BX LIVER PERCUTANEOUS    Narrative  PREOPERATIVE DIAGNOSIS: Multiple liver masses. Unknown primary. POSTOPERATIVE DIAGNOSIS: Same    INDICATION: 73years old female with history of unknown primary and recently  discovered multiple liver masses. Patient is here for US-guided core needle  biopsy of the liver lesion. ATTENDING: Dr. Lev Jarvis M.D.    Mik Viky: None. PROCEDURES: Ultrasound guided core needle biopsy of the right hepatic lobe. ANESTHESIA: 1% local lidocaine as well as moderate intravenous sedation with  Versed and fentanyl given and monitored per independently trained interventional  radiology nurse under my direct supervision for 30 minutes.  Please see detailed  nursing records for medication dosing. CONTRAST: None. COMPLICATIONS: None    DRAIN: No    CATHETER: None. EBL: Minimal.    SPECIMEN: 7 core biopsy specimens from the large segment 6 lesion were obtained. Specimens were placed in formalin container and given to pathology on site. Sampling was adequate. TECHNIQUE: After detailed explanation of risks and benefits of the procedure  verbal and written consent were obtained. Patient was brought to the fluoroscopy  room and placed supine on the table. Timeout was performed. Limited evaluation  of the epigastric region with ultrasound was performed. Target lesion was  identified and skin was marked. Epigastric region was prepped and draped in  usual sterile fashion. Maximum sterile barrier technique was used. 1% lidocaine  solution was instilled in the skin superficial and deep subcutaneous soft  tissues overlying the biopsy region. Under direct ultrasonographic guidance using 18-gauge Temno core biopsy needle  was advanced down through the guide into the lesion. 7 passes were made and core  biopsies were placed in formalin container and given to pathology on site. Sampling was adequate. Needle was removed. Postbiopsy imaging was obtained. Sterile dressing was applied. Hemostasis was achieved with manual compression  and Gelfoam slurry. There were no immediate complications. Patient tolerated procedure fairly well. Patient was transferred to recovery in stable condition. I was present and  performed the procedure. FINDINGS: Ultrasonographic evaluation of the hepatic parenchyma demonstrated  large heterogeneously echogenic lesions in the right as well as left hepatic  lobes. No intrahepatic or extrahepatic biliary ductal dilatation is seen. Ultrasonographic guidance demonstrated good position of the biopsy needle. Postbiopsy imaging demonstrated no evidence of bleeding.     Impression  Successful, uncomplicated ultrasound-guided liver mass core needle biopsy. Results for orders placed during the hospital encounter of 10/22/21    XR ERCP / ERCB COMBINED    Narrative  INDICATION: surgery    EXAM: XR ERCP / ERCB COMBINED    FINDINGS: Fluoroscopic imaging utilized during procedure. Impression  Fluoroscopic imaging during procedure. REPORT PROVIDED FOR COMPLIANCE ONLY AT NO CHARGE. Results for orders placed during the hospital encounter of 10/20/21    CT ABD PELV W CONT    Narrative  CT of abdomen and pelvis with contrast    INDICATION: Upper abdominal pain, scleral icterus, weight loss    COMPARISON: None. TECHNIQUE: 5 mm helical scan to the abdomen and pelvis is obtained  from the  diaphragm to the symphysis pubis after uneventful nonionic IV contrast  administration. All CT scans at this facility performed using dose optimization techniques as  appreciated to a performed exam, to include automated exposure control,  adjustment of the mA and or KU according to patient size (including appropriate  matching for site specific examination), or use of iterative reconstruction  technique. FINDINGS:    Lung Bases: Clear. Liver: There are multiple ill-defined hypodense masses/nodules with mild rim  enhancement scattered throughout, the largest measures 2.7 x 3.1 x 2.9 cm in  segment one lateral to the IVC. Gallbladder: Elongated and distended gallbladder noted. Biliary System: There is moderate to significant intrahepatic ductal dilatation  and marked common bile duct dilatation, measuring up to 1.6 cm. Marked  pancreatic ductal dilatation also demonstrated. Abrupt caliber change at very  distal common bile duct and proximal pancreatic duct above the level of ampulla. Spleen: Normal.  Pancreas: There is a large ill-defined hypodense mass identified in the uncinate  process, roughly measures 2.1 x 3.0 x 5.0 cm. There is moderate mass effect with  lateral displacement SMA/SMV and partial encasement of SMV. Bowel:  The small and large bowel are nondilated. Adrenal Glands: Normal.  Kidneys: 1 cm cortical cyst in the lateral midpole of right kidney. Lower genitourinary system: The bladder is poorly distended. The uterus is  lobulated. There is 3.3 x 3.4 cm heterogeneous enhancing mass exophytically seen  in anterior uterus. Peritoneum/Retroperitoneum: No adenopathy. Vasculature: Moderate atherosclerotic aortic disease. Other: Small free fluid in the pelvic floor. .    CT OSSEOUS STRUCTURES:    Mild spondylosis. Impression  1. Large ill-defined hypoenhancing mass in the uncinate process with mass effect  and displacement of SMA/SMV, most concerned for primary malignancy. 2. Numerous ill-defined hypodense masses in the liver, most compatible with  metastasis. 3. Marked intrahepatic and extrahepatic biliary dilatation, likely mass effect  by tumor compression in the region of ampulla. 4. No lymphadenopathy. 5. Small left renal cyst. Small pelvic free fluid. Thank you for this referral.        Pathology          Assessment:                                        1. Pancreatic carcinoma metastatic to liver ([address]    2. Pancreatic adenocarcinoma ([address]    3. Liver metastases ([address]    4. Obstructive jaundice    5. Iron deficiency anemia, unspecified iron deficiency anemia type    6. Cancer related pain    7. Leukocytosis, unspecified type        Plan:                                        # Metastatic Pancreatic adenocarcinoma  # Liver metastases  # Obstructive jaundice-- s.p stent placement  -- Patient presented to ED with abdominal pain started 3 weeks ago, associated with weight loss. She denied vomiting, fever and chills. Denies EtOH abuse. She has smoked for about 15 years, just quit about a month ago. -- 10/22-26/2021 Admitted for obstructive jaundice r/t new findings of pancreatic mass with multiple liver lesions, suspected pancreatic cancer with liver metastases and protein malnutrition.   -- 10/20/2021 CT abd/pelvis reported large ill-defined hypo-enhancing mass in the uncinate process with mass effect and displacement of SMA/SMV, most concerned for primary malignancy.  + Numerous ill-defined hypodense masses in the liver, most compatible with metastasis. +  Marked intrahepatic and extrahepatic biliary dilatation, likely mass effect by tumor compression in the region of ampulla.  + No lymphadenopathy. -- 10/22/2021 MRCP reported 3 cm x 2.1 cm x 5 cm obstructing pancreatic head/uncinate mass consistent with neoplasm, specifically adenocarcinoma.  +  Innumerable hepatic metastatic lesions.  --10/25/2021 S/p ERCP with stent placement and biopsies taken. Bilirubin levels trend down. -- 10/26/2021 Common Bile Duct Brushing, ThinPrep reported rare atypical cells present in a background of benign ductal epithelium and bile. Rare atypical cells are present, suspicious for but not diagnostic of malignancy. These could also represent reactive/reparative ductal epithelial cells. -- She requested to refill pain medications. She reported right arm swelling for last few days. PET scan   -- 11/11/2021 PET scan reported a known pancreatic mass is diffusely hypermetabolic consistent with  Malignancy.  + Numerous hepatic mass lesions are variably hypermetabolic consistent with metastasis with variable degrees of central necrosis.  + No focal hypermetabolic lesions identified elsewhere to diagnose additional metastatic disease. -- 11/24/2021 IR Mediport placement  -- 11/30/2021 IR- Liver mass, core biopsies reported consistent with metastatic adenocarcinoma of pancreatic origin. -- 12/7/2021 C1 D1 Palliative mFOLFOX  -- Tumor NGS Caris obtained. -- Patient reported worsening fatigue, poor appetite. Cachexia (+). She has declined recent IV Iron therapy. Family reported she has declined to take medications. We have lengthy discuss today with the patient and her family. Today she declines further cancer treatment.  Shes aware her metastatic cancer status and poor performance status. She was not interested in further cancer treatment. She would like to focus on comfort measures only. She would like to go to hospice. -- We will refer her to hospice. Advised the patient to contact our service as needed. No orders of the defined types were placed in this encounter. Ms. Tracy Bradley has a reminder for a \"due or due soon\" health maintenance. I have asked that she contact her primary care provider for follow-up on this health maintenance. All of patient's questions answered to their apparent satisfaction. They verbally show understanding and agreement with aforementioned plan. Estrellita Navarrete MD  12/20/2021              Above mentioned total time spent for this encounter with more than 50% of the time spent in face-to-face counseling, discussing on diagnosis and management plan going forward, and co-ordination of care. Parts of this document has been produced using Dragon dictation system. Unrecognized errors in transcription may be present. Please do not hesitate to reach out for any questions or clarifications.         CC: Rimma Dow MD

## 2021-12-21 NOTE — ADDENDUM NOTE
Encounter addended by: Yane Ochoa RN on: 12/21/2021 12:57 PM   Actions taken: Charge Capture section accepted

## 2021-12-21 NOTE — ADDENDUM NOTE
Encounter addended by: Penny Ledesma RN on: 12/21/2021 4:02 PM   Actions taken: Charge Capture section accepted

## 2022-01-11 NOTE — ADDENDUM NOTE
Encounter addended by: Rajesh Dietrich RN on: 1/11/2022 1:30 PM   Actions taken: Charge Capture section accepted

## 2024-02-15 NOTE — PROGRESS NOTES
Bedside shift change report given to santiago (oncoming nurse) by yanira (offgoing nurse). Report included the following information SBAR, Kardex, ED Summary, Intake/Output, MAR and Recent Results. No changes throughout shift. Maintenance fluids running. Prn pain medication given throughout shift. Family at bedside. Detail Level: Detailed Comment: Accession # [id number]\\nVerbal consent given Render Risk Assessment In Note?: no Comment: Verbal consent given

## (undated) DEVICE — ENDO CARRY-ON PROCEDURE KIT INCLUDES ENZYMATIC SPONGE, GAUZE, BIOHAZARD LABEL, TRAY, LUBRICANT, DIRTY SCOPE LABEL, WATER LABEL, TRAY, DRAWSTRING PAD, AND DEFENDO 4-PIECE KIT.: Brand: ENDO CARRY-ON PROCEDURE KIT

## (undated) DEVICE — CANNULATING SPHINCTEROTOME: Brand: JAGTOME™ REVOLUTION RX

## (undated) DEVICE — GUIDEWIRE LOCKING DEVICE BIOPSY CAP

## (undated) DEVICE — Device

## (undated) DEVICE — RETRIEVAL BALLOON CATHETER: Brand: EXTRACTOR™ PRO RX

## (undated) DEVICE — SINGLE USE AIR/WATER SUCTION AND BIOPSY SET WITH WATER JET CONNECTOR

## (undated) DEVICE — DEVICE LCK BILI RAP EXCHG OLPS --

## (undated) DEVICE — MAJ-253 TEFLON SHEATH FOR HX-20U-1 LOOP: Brand: TUBE SHEATH

## (undated) DEVICE — KIT,1200CC CANISTER,3/16"X6' TUBING: Brand: MEDLINE INDUSTRIES, INC.

## (undated) DEVICE — GARMENT,MEDLINE,DVT,INT,CALF,MED, GEN2: Brand: MEDLINE